# Patient Record
Sex: FEMALE | Race: BLACK OR AFRICAN AMERICAN | Employment: UNEMPLOYED | URBAN - METROPOLITAN AREA
[De-identification: names, ages, dates, MRNs, and addresses within clinical notes are randomized per-mention and may not be internally consistent; named-entity substitution may affect disease eponyms.]

---

## 2021-03-06 ENCOUNTER — APPOINTMENT (OUTPATIENT)
Dept: GENERAL RADIOLOGY | Age: 46
End: 2021-03-06
Attending: EMERGENCY MEDICINE
Payer: MEDICARE

## 2021-03-06 ENCOUNTER — HOSPITAL ENCOUNTER (EMERGENCY)
Age: 46
Discharge: HOME | End: 2021-03-06
Attending: EMERGENCY MEDICINE
Payer: MEDICARE

## 2021-03-06 VITALS
RESPIRATION RATE: 17 BRPM | HEART RATE: 98 BPM | SYSTOLIC BLOOD PRESSURE: 122 MMHG | DIASTOLIC BLOOD PRESSURE: 97 MMHG | HEIGHT: 59 IN | WEIGHT: 165 LBS | OXYGEN SATURATION: 99 % | TEMPERATURE: 99.4 F | BODY MASS INDEX: 33.26 KG/M2

## 2021-03-06 DIAGNOSIS — R07.89 ATYPICAL CHEST PAIN: Primary | ICD-10-CM

## 2021-03-06 DIAGNOSIS — C50.919 METASTATIC BREAST CANCER (HCC): ICD-10-CM

## 2021-03-06 DIAGNOSIS — Z51.5 HOSPICE CARE: ICD-10-CM

## 2021-03-06 LAB
ANION GAP SERPL CALC-SCNC: 8 MMOL/L (ref 5–15)
ATRIAL RATE: 112 BPM
BASOPHILS # BLD: 0 K/UL (ref 0–0.1)
BASOPHILS NFR BLD: 0 % (ref 0–1)
BUN SERPL-MCNC: 5 MG/DL (ref 6–20)
BUN/CREAT SERPL: 6 (ref 12–20)
CA-I BLD-MCNC: 9.3 MG/DL (ref 8.5–10.1)
CALCULATED P AXIS, ECG09: 63 DEGREES
CALCULATED R AXIS, ECG10: 45 DEGREES
CALCULATED T AXIS, ECG11: 34 DEGREES
CHLORIDE SERPL-SCNC: 103 MMOL/L (ref 97–108)
CO2 SERPL-SCNC: 29 MMOL/L (ref 21–32)
CREAT SERPL-MCNC: 0.79 MG/DL (ref 0.55–1.02)
DIAGNOSIS, 93000: NORMAL
DIFFERENTIAL METHOD BLD: NORMAL
EOSINOPHIL # BLD: 0.1 K/UL (ref 0–0.4)
EOSINOPHIL NFR BLD: 1 % (ref 0–7)
ERYTHROCYTE [DISTWIDTH] IN BLOOD BY AUTOMATED COUNT: 13.1 % (ref 11.5–14.5)
GLUCOSE SERPL-MCNC: 92 MG/DL (ref 65–100)
HCT VFR BLD AUTO: 40.3 % (ref 35–47)
HGB BLD-MCNC: 13 G/DL (ref 11.5–16)
IMM GRANULOCYTES # BLD AUTO: 0 K/UL (ref 0–0.04)
IMM GRANULOCYTES NFR BLD AUTO: 0 % (ref 0–0.5)
LYMPHOCYTES # BLD: 1.7 K/UL (ref 0.8–3.5)
LYMPHOCYTES NFR BLD: 19 % (ref 12–49)
MCH RBC QN AUTO: 28 PG (ref 26–34)
MCHC RBC AUTO-ENTMCNC: 32.3 G/DL (ref 30–36.5)
MCV RBC AUTO: 86.9 FL (ref 80–99)
MONOCYTES # BLD: 0.6 K/UL (ref 0–1)
MONOCYTES NFR BLD: 7 % (ref 5–13)
NEUTS SEG # BLD: 6.5 K/UL (ref 1.8–8)
NEUTS SEG NFR BLD: 73 % (ref 32–75)
P-R INTERVAL, ECG05: 134 MS
PLATELET # BLD AUTO: 399 K/UL (ref 150–400)
PMV BLD AUTO: 10.6 FL (ref 8.9–12.9)
POTASSIUM SERPL-SCNC: 3.6 MMOL/L (ref 3.5–5.1)
Q-T INTERVAL, ECG07: 330 MS
QRS DURATION, ECG06: 72 MS
QTC CALCULATION (BEZET), ECG08: 450 MS
RBC # BLD AUTO: 4.64 M/UL (ref 3.8–5.2)
SODIUM SERPL-SCNC: 140 MMOL/L (ref 136–145)
TROPONIN I SERPL-MCNC: <0.05 NG/ML
VENTRICULAR RATE, ECG03: 112 BPM
WBC # BLD AUTO: 8.9 K/UL (ref 3.6–11)

## 2021-03-06 PROCEDURE — 99285 EMERGENCY DEPT VISIT HI MDM: CPT

## 2021-03-06 PROCEDURE — 93005 ELECTROCARDIOGRAM TRACING: CPT

## 2021-03-06 PROCEDURE — 71045 X-RAY EXAM CHEST 1 VIEW: CPT

## 2021-03-06 PROCEDURE — 36415 COLL VENOUS BLD VENIPUNCTURE: CPT

## 2021-03-06 PROCEDURE — 85025 COMPLETE CBC W/AUTO DIFF WBC: CPT

## 2021-03-06 PROCEDURE — 74011250636 HC RX REV CODE- 250/636: Performed by: EMERGENCY MEDICINE

## 2021-03-06 PROCEDURE — 84484 ASSAY OF TROPONIN QUANT: CPT

## 2021-03-06 PROCEDURE — 80048 BASIC METABOLIC PNL TOTAL CA: CPT

## 2021-03-06 PROCEDURE — 96374 THER/PROPH/DIAG INJ IV PUSH: CPT

## 2021-03-06 RX ORDER — ASPIRIN 325 MG
325 TABLET ORAL
Status: DISCONTINUED | OUTPATIENT
Start: 2021-03-06 | End: 2021-03-06 | Stop reason: HOSPADM

## 2021-03-06 RX ORDER — MORPHINE SULFATE 2 MG/ML
2 INJECTION, SOLUTION INTRAMUSCULAR; INTRAVENOUS ONCE
Status: COMPLETED | OUTPATIENT
Start: 2021-03-06 | End: 2021-03-06

## 2021-03-06 RX ADMIN — MORPHINE SULFATE 2 MG: 2 INJECTION, SOLUTION INTRAMUSCULAR; INTRAVENOUS at 16:40

## 2021-03-06 NOTE — ED PROVIDER NOTES
EMERGENCY DEPARTMENT HISTORY AND PHYSICAL EXAM      Date: 3/6/2021  Patient Name: Sher Marmolejo    History of Presenting Illness     Chief Complaint   Patient presents with    Chest Pain       History Provided By: Patient    HPI: Sher Marmolejo, 48 y.o. female presents to the ED with cc of left-sided chest pain located mid sternal, moderate severity, no known exacerbating or relieving factors. Quality of pain is dull pressure with no radiation. Associated with shortness of breath, tachycardia, diaphoresis and palpitations. Pain resolved after taking nitro and morphine. Patient has a past medical history remarkable for coronary artery disease needing stent placement, right sided brain tumor and bilateral triple negative breast cancer. Pt also admits to being on hospice. Chief Complaint   Patient presents with    Chest Pain         There are no other complaints, changes, or physical findings at this time. PCP: None    No current facility-administered medications on file prior to encounter. No current outpatient medications on file prior to encounter. Past History     Past Medical History:  No past medical history on file. Past Surgical History:  No past surgical history on file. Family History:  No family history on file. Social History:  Social History     Tobacco Use    Smoking status: Not on file   Substance Use Topics    Alcohol use: Not on file    Drug use: Not on file       Allergies: Allergies   Allergen Reactions    Pcn [Penicillins] Swelling         Review of Systems   Review of Systems   Constitutional: Negative. HENT: Negative for congestion, nosebleeds, sinus pressure and sinus pain. Eyes: Negative. Negative for photophobia. Respiratory: Positive for chest tightness and shortness of breath. Negative for apnea, cough, choking, wheezing and stridor. Cardiovascular: Positive for chest pain and palpitations. Gastrointestinal: Negative.     Genitourinary: Negative. Musculoskeletal: Negative. Negative for back pain and gait problem. Skin: Negative. Allergic/Immunologic: Negative. Neurological: Negative. Negative for weakness, light-headedness and numbness. Hematological: Negative. Psychiatric/Behavioral: Negative. Physical Exam   Physical Exam  Vitals signs and nursing note reviewed. Constitutional:       Appearance: Normal appearance. HENT:      Head: Normocephalic and atraumatic. Mouth/Throat:      Pharynx: Oropharynx is clear. Eyes:      Extraocular Movements: Extraocular movements intact. Pupils: Pupils are equal, round, and reactive to light. Neck:      Musculoskeletal: Normal range of motion and neck supple. Cardiovascular:      Rate and Rhythm: Regular rhythm. Tachycardia present. Pulses: Normal pulses. Heart sounds: Normal heart sounds. Comments: Chest wall tenderness mild  Pulmonary:      Breath sounds: Normal breath sounds. Abdominal:      General: Abdomen is flat. Bowel sounds are normal.      Palpations: Abdomen is soft. Musculoskeletal: Normal range of motion. Skin:     General: Skin is warm and dry. Neurological:      General: No focal deficit present. Mental Status: She is alert and oriented to person, place, and time. Psychiatric:         Mood and Affect: Mood is anxious.          Behavior: Behavior normal.         Diagnostic Study Results     Labs -     Recent Results (from the past 12 hour(s))   EKG, 12 LEAD, INITIAL    Collection Time: 03/06/21  4:03 PM   Result Value Ref Range    Ventricular Rate 112 BPM    Atrial Rate 112 BPM    P-R Interval 134 ms    QRS Duration 72 ms    Q-T Interval 330 ms    QTC Calculation (Bezet) 450 ms    Calculated P Axis 63 degrees    Calculated R Axis 45 degrees    Calculated T Axis 34 degrees    Diagnosis       Sinus tachycardia  Possible Left atrial enlargement  Borderline ECG  No previous ECGs available  Confirmed by Peace LYN, [address] [place] [address] ([id number]) on 3/6/2021 4:49:95 PM     METABOLIC PANEL, BASIC    Collection Time: 03/06/21  4:30 PM   Result Value Ref Range    Sodium 140 136 - 145 mmol/L    Potassium 3.6 3.5 - 5.1 mmol/L    Chloride 103 97 - 108 mmol/L    CO2 29 21 - 32 mmol/L    Anion gap 8 5 - 15 mmol/L    Glucose 92 65 - 100 mg/dL    BUN 5 (L) 6 - 20 mg/dL    Creatinine 0.79 0.55 - 1.02 mg/dL    BUN/Creatinine ratio 6 (L) 12 - 20      GFR est AA >60 >60 ml/min/1.73m2    GFR est non-AA >60 >60 ml/min/1.73m2    Calcium 9.3 8.5 - 10.1 mg/dL   CBC WITH AUTOMATED DIFF    Collection Time: 03/06/21  4:30 PM   Result Value Ref Range    WBC 8.9 3.6 - 11.0 K/uL    RBC 4.64 3.80 - 5.20 M/uL    HGB 13.0 11.5 - 16.0 g/dL    HCT 40.3 35.0 - 47.0 %    MCV 86.9 80.0 - 99.0 FL    MCH 28.0 26.0 - 34.0 PG    MCHC 32.3 30.0 - 36.5 g/dL    RDW 13.1 11.5 - 14.5 %    PLATELET 951 636 - 344 K/uL    MPV 10.6 8.9 - 12.9 FL    NEUTROPHILS 73 32 - 75 %    LYMPHOCYTES 19 12 - 49 %    MONOCYTES 7 5 - 13 %    EOSINOPHILS 1 0 - 7 %    BASOPHILS 0 0 - 1 %    IMMATURE GRANULOCYTES 0 0.0 - 0.5 %    ABS. NEUTROPHILS 6.5 1.8 - 8.0 K/UL    ABS. LYMPHOCYTES 1.7 0.8 - 3.5 K/UL    ABS. MONOCYTES 0.6 0.0 - 1.0 K/UL    ABS. EOSINOPHILS 0.1 0.0 - 0.4 K/UL    ABS. BASOPHILS 0.0 0.0 - 0.1 K/UL    ABS. IMM. GRANS. 0.0 0.00 - 0.04 K/UL    DF AUTOMATED     TROPONIN I    Collection Time: 03/06/21  4:30 PM   Result Value Ref Range    Troponin-I, Qt. <0.05 <0.05 ng/mL       Labs reviewed by me    Radiologic Studies -   XR CHEST PORT   Final Result   The heart, mediastinum, and pulmonary vasculature appear within   normal limits. Right venous port catheter terminates over the SVC at the level   of the right hilum. The heart, mediastinum, and pulmonary vasculature appear   within normal limits. No evidence of pneumothorax. CT Results  (Last 48 hours)    None        CXR Results  (Last 48 hours)               03/06/21 1643  XR CHEST PORT Final result    Impression:   The heart, mediastinum, and pulmonary vasculature appear within   normal limits. Right venous port catheter terminates over the SVC at the level   of the right hilum. The heart, mediastinum, and pulmonary vasculature appear   within normal limits. No evidence of pneumothorax. Narrative:  Portable chest, 1648 hours. No comparisons. Medical Decision Making     I am the first provider for this patient. I reviewed the vital signs, available nursing notes, past medical history, past surgical history, family history and social history. RADIOLOGY report and LABS reviewed by me    Vital Signs-Reviewed the patient's vital signs. Patient Vitals for the past 12 hrs:   Temp Pulse Resp BP SpO2   03/06/21 1930  98 17 (!) 122/97 99 %   03/06/21 1830  91 16 131/82 99 %   03/06/21 1603 99.4 °F (37.4 °C) (!) 118 18 (!) 135/91 99 %       EKG interpretation: (Preliminary)      Records Reviewed: Nurse's note. Provider Notes (Medical Decision Making):    Patient presents with DIFF DX :         ED Course:   Initial assessment performed. The patients presenting problems have been discussed, and they are in agreement with the care plan formulated and outlined with them. I have encouraged them to ask questions as they arise throughout their visit. TREATMENT RESPONSE -Stable          Cyndi Kumar MD      Disposition:  Discharged   Diagnostic tests were reviewed and questions answered. Diagnosis, care plan and treatment options were discussed. The patient understand instructions and will follow up as directed. Condition stable    Admitting Provider:  No admitting provider for patient encounter. Consulting Provider:  No ref. provider found       DISCHARGE PLAN:  1. There are no discharge medications for this patient. 2.   Follow-up Information    None       3. Return to ED if worse     Diagnosis     Clinical Impression:     ICD-10-CM ICD-9-CM    1. Atypical chest pain  R07.89 786.59    2.  Metastatic breast cancer ([facility] [address] C50.919 174.9    3. Hospice care  Z51.5 V66.7         Attestations:    [facility] MD DONNELL    Please note that this dictation was completed with [email or web address], the computer voice recognition software. Quite often unanticipated grammatical, syntax, homophones, and other interpretive errors are inadvertently transcribed by the computer software. Please disregard these errors. Please excuse any errors that have escaped final proofreading. Thank you.

## 2021-03-06 NOTE — ED TRIAGE NOTES
Chest pain for a week left side, tight and sharp eases with nitro, has had two today and one approx an hour ago, EMS gave , hx of stg 4 breast CA, on hospice

## 2021-03-07 NOTE — DISCHARGE INSTRUCTIONS
Thank you! Thank you for allowing me to care for you in the emergency department. I sincerely hope that you are satisfied with your visit today. It is my goal to provide you with excellent care. Below you will find a list of your labs and imaging from your visit today. Should you have any questions regarding these results please do not hesitate to call the emergency department. Labs -     Recent Results (from the past 12 hour(s))   METABOLIC PANEL, BASIC    Collection Time: 03/06/21  4:30 PM   Result Value Ref Range    Sodium 140 136 - 145 mmol/L    Potassium 3.6 3.5 - 5.1 mmol/L    Chloride 103 97 - 108 mmol/L    CO2 29 21 - 32 mmol/L    Anion gap 8 5 - 15 mmol/L    Glucose 92 65 - 100 mg/dL    BUN 5 (L) 6 - 20 mg/dL    Creatinine 0.79 0.55 - 1.02 mg/dL    BUN/Creatinine ratio 6 (L) 12 - 20      GFR est AA >60 >60 ml/min/1.73m2    GFR est non-AA >60 >60 ml/min/1.73m2    Calcium 9.3 8.5 - 10.1 mg/dL   CBC WITH AUTOMATED DIFF    Collection Time: 03/06/21  4:30 PM   Result Value Ref Range    WBC 8.9 3.6 - 11.0 K/uL    RBC 4.64 3.80 - 5.20 M/uL    HGB 13.0 11.5 - 16.0 g/dL    HCT 40.3 35.0 - 47.0 %    MCV 86.9 80.0 - 99.0 FL    MCH 28.0 26.0 - 34.0 PG    MCHC 32.3 30.0 - 36.5 g/dL    RDW 13.1 11.5 - 14.5 %    PLATELET 907 555 - 453 K/uL    MPV 10.6 8.9 - 12.9 FL    NEUTROPHILS 73 32 - 75 %    LYMPHOCYTES 19 12 - 49 %    MONOCYTES 7 5 - 13 %    EOSINOPHILS 1 0 - 7 %    BASOPHILS 0 0 - 1 %    IMMATURE GRANULOCYTES 0 0.0 - 0.5 %    ABS. NEUTROPHILS 6.5 1.8 - 8.0 K/UL    ABS. LYMPHOCYTES 1.7 0.8 - 3.5 K/UL    ABS. MONOCYTES 0.6 0.0 - 1.0 K/UL    ABS. EOSINOPHILS 0.1 0.0 - 0.4 K/UL    ABS. BASOPHILS 0.0 0.0 - 0.1 K/UL    ABS. IMM.  GRANS. 0.0 0.00 - 0.04 K/UL    DF AUTOMATED     TROPONIN I    Collection Time: 03/06/21  4:30 PM   Result Value Ref Range    Troponin-I, Qt. <0.05 <0.05 ng/mL       Radiologic Studies -   XR CHEST PORT   Final Result   The heart, mediastinum, and pulmonary vasculature appear within normal limits. Right venous port catheter terminates over the SVC at the level   of the right hilum. The heart, mediastinum, and pulmonary vasculature appear   within normal limits. No evidence of pneumothorax. CT Results  (Last 48 hours)      None          CXR Results  (Last 48 hours)                 03/06/21 1643  XR CHEST PORT Final result    Impression: The heart, mediastinum, and pulmonary vasculature appear within   normal limits. Right venous port catheter terminates over the SVC at the level   of the right hilum. The heart, mediastinum, and pulmonary vasculature appear   within normal limits. No evidence of pneumothorax. Narrative:  Portable chest, 1648 hours. No comparisons. If you feel that you have not received excellent quality care or timely care, please ask to speak to the nurse manager. Please choose us in the future for your continued health care needs. ------------------------------------------------------------------------------------------------------------  The exam and treatment you received in the Emergency Department were for an urgent problem and are not intended as complete care. It is important that you follow-up with a doctor, nurse practitioner, or physician assistant to:  (1) confirm your diagnosis,  (2) re-evaluation of changes in your illness and treatment, and  (3) for ongoing care. If your symptoms become worse or you do not improve as expected and you are unable to reach your usual health care provider, you should return to the Emergency Department. We are available 24 hours a day. Please take your discharge instructions with you when you go to your follow-up appointment. If you have any problem arranging a follow-up appointment, contact the Emergency Department immediately. If a prescription has been provided, please have it filled as soon as possible to prevent a delay in treatment.  Read the entire medication instruction sheet provided to you by the pharmacy. If you have any questions or reservations about taking the medication due to side effects or interactions with other medications, please call your primary care physician or contact the ER to speak with the charge nurse. Make an appointment with your family doctor or the physician you were referred to for follow-up of this visit as instructed on your discharge paperwork, as this is a mandatory follow-up. Return to the ER if you are unable to be seen or if you are unable to be seen in a timely manner. If you have any problem arranging the follow-up visit, contact the Emergency Department immediately.

## 2021-12-06 ENCOUNTER — HOSPICE ADMISSION (OUTPATIENT)
Dept: HOSPICE | Facility: HOSPICE | Age: 46
End: 2021-12-06
Payer: COMMERCIAL

## 2021-12-06 ENCOUNTER — HOSPITAL ENCOUNTER (INPATIENT)
Age: 46
LOS: 3 days | Discharge: HOSPICE | End: 2021-12-09
Attending: FAMILY MEDICINE | Admitting: FAMILY MEDICINE

## 2021-12-06 DIAGNOSIS — G89.3 CANCER ASSOCIATED PAIN: ICD-10-CM

## 2021-12-06 DIAGNOSIS — Z51.5 HOSPICE CARE: ICD-10-CM

## 2021-12-06 DIAGNOSIS — C50.919 METASTATIC BREAST CANCER (HCC): ICD-10-CM

## 2021-12-06 PROCEDURE — 0656 HSPC GENERAL INPATIENT

## 2021-12-06 PROCEDURE — G0299 HHS/HOSPICE OF RN EA 15 MIN: HCPCS

## 2021-12-06 PROCEDURE — 74011250637 HC RX REV CODE- 250/637: Performed by: FAMILY MEDICINE

## 2021-12-06 PROCEDURE — 74011250636 HC RX REV CODE- 250/636: Performed by: FAMILY MEDICINE

## 2021-12-06 PROCEDURE — 3336500001 HSPC ELECTION

## 2021-12-06 PROCEDURE — 74011250637 HC RX REV CODE- 250/637: Performed by: INTERNAL MEDICINE

## 2021-12-06 RX ORDER — GABAPENTIN 300 MG/1
300 CAPSULE ORAL 3 TIMES DAILY
Status: DISCONTINUED | OUTPATIENT
Start: 2021-12-06 | End: 2021-12-07

## 2021-12-06 RX ORDER — QUETIAPINE FUMARATE 100 MG/1
100 TABLET, FILM COATED ORAL DAILY
Status: DISCONTINUED | OUTPATIENT
Start: 2021-12-06 | End: 2021-12-06

## 2021-12-06 RX ORDER — QUETIAPINE FUMARATE 100 MG/1
300 TABLET, FILM COATED ORAL
Status: DISCONTINUED | OUTPATIENT
Start: 2021-12-07 | End: 2021-12-09 | Stop reason: HOSPADM

## 2021-12-06 RX ORDER — HYOSCYAMINE SULFATE 0.12 MG/1
0.12 TABLET SUBLINGUAL
Status: DISCONTINUED | OUTPATIENT
Start: 2021-12-06 | End: 2021-12-09 | Stop reason: HOSPADM

## 2021-12-06 RX ORDER — MORPHINE SULFATE 20 MG/ML
5 SOLUTION ORAL
Status: DISCONTINUED | OUTPATIENT
Start: 2021-12-06 | End: 2021-12-06

## 2021-12-06 RX ORDER — ALBUTEROL SULFATE 90 UG/1
2 AEROSOL, METERED RESPIRATORY (INHALATION)
Status: DISCONTINUED | OUTPATIENT
Start: 2021-12-06 | End: 2021-12-09 | Stop reason: HOSPADM

## 2021-12-06 RX ORDER — DEXAMETHASONE 4 MG/1
4 TABLET ORAL 2 TIMES DAILY
Status: DISCONTINUED | OUTPATIENT
Start: 2021-12-06 | End: 2021-12-09 | Stop reason: HOSPADM

## 2021-12-06 RX ORDER — LEVETIRACETAM 500 MG/1
500 TABLET ORAL 3 TIMES DAILY
Status: DISCONTINUED | OUTPATIENT
Start: 2021-12-06 | End: 2021-12-09 | Stop reason: HOSPADM

## 2021-12-06 RX ORDER — LORAZEPAM 2 MG/ML
2 CONCENTRATE ORAL
Status: DISCONTINUED | OUTPATIENT
Start: 2021-12-06 | End: 2021-12-09 | Stop reason: HOSPADM

## 2021-12-06 RX ORDER — LORAZEPAM 2 MG/ML
1 CONCENTRATE ORAL
Status: DISCONTINUED | OUTPATIENT
Start: 2021-12-06 | End: 2021-12-06

## 2021-12-06 RX ORDER — METOPROLOL SUCCINATE 50 MG/1
50 TABLET, EXTENDED RELEASE ORAL DAILY
Status: DISCONTINUED | OUTPATIENT
Start: 2021-12-06 | End: 2021-12-09 | Stop reason: HOSPADM

## 2021-12-06 RX ORDER — LORAZEPAM 2 MG/ML
1 CONCENTRATE ORAL
Status: DISCONTINUED | OUTPATIENT
Start: 2021-12-06 | End: 2021-12-09 | Stop reason: HOSPADM

## 2021-12-06 RX ORDER — TRAZODONE HYDROCHLORIDE 50 MG/1
50 TABLET ORAL
Status: DISCONTINUED | OUTPATIENT
Start: 2021-12-06 | End: 2021-12-09 | Stop reason: HOSPADM

## 2021-12-06 RX ORDER — LORAZEPAM 1 MG/1
1 TABLET ORAL
Status: DISCONTINUED | OUTPATIENT
Start: 2021-12-06 | End: 2021-12-09 | Stop reason: HOSPADM

## 2021-12-06 RX ORDER — SPIRONOLACTONE 25 MG/1
25 TABLET ORAL DAILY
Status: DISCONTINUED | OUTPATIENT
Start: 2021-12-06 | End: 2021-12-09 | Stop reason: HOSPADM

## 2021-12-06 RX ORDER — SENNOSIDES 8.6 MG/1
1 TABLET ORAL DAILY PRN
Status: DISCONTINUED | OUTPATIENT
Start: 2021-12-06 | End: 2021-12-09 | Stop reason: HOSPADM

## 2021-12-06 RX ORDER — PROMETHAZINE HYDROCHLORIDE 25 MG/1
25 TABLET ORAL
Status: DISCONTINUED | OUTPATIENT
Start: 2021-12-06 | End: 2021-12-09 | Stop reason: HOSPADM

## 2021-12-06 RX ORDER — HYDROMORPHONE HYDROCHLORIDE 2 MG/ML
2 INJECTION, SOLUTION INTRAMUSCULAR; INTRAVENOUS; SUBCUTANEOUS
Status: DISCONTINUED | OUTPATIENT
Start: 2021-12-06 | End: 2021-12-07

## 2021-12-06 RX ORDER — METHADONE HYDROCHLORIDE 10 MG/1
40 TABLET ORAL 3 TIMES DAILY
Status: DISCONTINUED | OUTPATIENT
Start: 2021-12-06 | End: 2021-12-06

## 2021-12-06 RX ORDER — SULFAMETHOXAZOLE AND TRIMETHOPRIM 800; 160 MG/1; MG/1
2 TABLET ORAL EVERY 12 HOURS
Status: DISCONTINUED | OUTPATIENT
Start: 2021-12-06 | End: 2021-12-06

## 2021-12-06 RX ORDER — FACIAL-BODY WIPES
10 EACH TOPICAL DAILY PRN
Status: DISCONTINUED | OUTPATIENT
Start: 2021-12-06 | End: 2021-12-09 | Stop reason: HOSPADM

## 2021-12-06 RX ORDER — DICYCLOMINE HYDROCHLORIDE 10 MG/1
20 CAPSULE ORAL 2 TIMES DAILY
Status: DISCONTINUED | OUTPATIENT
Start: 2021-12-06 | End: 2021-12-09 | Stop reason: HOSPADM

## 2021-12-06 RX ORDER — ACETAMINOPHEN 650 MG/1
650 SUPPOSITORY RECTAL
Status: DISCONTINUED | OUTPATIENT
Start: 2021-12-06 | End: 2021-12-09 | Stop reason: HOSPADM

## 2021-12-06 RX ORDER — MORPHINE SULFATE 30 MG/1
60 TABLET, FILM COATED, EXTENDED RELEASE ORAL EVERY 12 HOURS
Status: DISCONTINUED | OUTPATIENT
Start: 2021-12-07 | End: 2021-12-07

## 2021-12-06 RX ORDER — CARISOPRODOL 350 MG/1
350 TABLET ORAL 3 TIMES DAILY
Status: DISCONTINUED | OUTPATIENT
Start: 2021-12-06 | End: 2021-12-09 | Stop reason: HOSPADM

## 2021-12-06 RX ORDER — LORAZEPAM 2 MG/ML
0.5 CONCENTRATE ORAL
Status: DISCONTINUED | OUTPATIENT
Start: 2021-12-06 | End: 2021-12-06

## 2021-12-06 RX ORDER — PHENYTOIN SODIUM 100 MG/1
100 CAPSULE, EXTENDED RELEASE ORAL 3 TIMES DAILY
Status: DISCONTINUED | OUTPATIENT
Start: 2021-12-06 | End: 2021-12-09 | Stop reason: HOSPADM

## 2021-12-06 RX ORDER — HYDROMORPHONE HYDROCHLORIDE 2 MG/1
8 TABLET ORAL
Status: DISCONTINUED | OUTPATIENT
Start: 2021-12-06 | End: 2021-12-08

## 2021-12-06 RX ORDER — METRONIDAZOLE 250 MG/1
500 TABLET ORAL DAILY
Status: DISCONTINUED | OUTPATIENT
Start: 2021-12-07 | End: 2021-12-09 | Stop reason: HOSPADM

## 2021-12-06 RX ORDER — DOCUSATE SODIUM 100 MG/1
100 CAPSULE, LIQUID FILLED ORAL
Status: DISCONTINUED | OUTPATIENT
Start: 2021-12-06 | End: 2021-12-09 | Stop reason: HOSPADM

## 2021-12-06 RX ORDER — HALOPERIDOL 2 MG/ML
2 SOLUTION ORAL
Status: DISCONTINUED | OUTPATIENT
Start: 2021-12-06 | End: 2021-12-09 | Stop reason: HOSPADM

## 2021-12-06 RX ADMIN — SPIRONOLACTONE 25 MG: 25 TABLET ORAL at 14:27

## 2021-12-06 RX ADMIN — HYDROMORPHONE HYDROCHLORIDE 8 MG: 2 TABLET ORAL at 11:07

## 2021-12-06 RX ADMIN — PHENYTOIN SODIUM 100 MG: 100 CAPSULE ORAL at 14:26

## 2021-12-06 RX ADMIN — LORAZEPAM 1 MG: 2 SOLUTION, CONCENTRATE ORAL at 22:19

## 2021-12-06 RX ADMIN — DEXAMETHASONE 4 MG: 4 TABLET ORAL at 14:26

## 2021-12-06 RX ADMIN — HYDROMORPHONE HYDROCHLORIDE 2 MG: 2 INJECTION INTRAMUSCULAR; INTRAVENOUS; SUBCUTANEOUS at 18:14

## 2021-12-06 RX ADMIN — LEVETIRACETAM 500 MG: 500 TABLET, FILM COATED ORAL at 22:00

## 2021-12-06 RX ADMIN — MORPHINE SULFATE 5 MG: 10 SOLUTION ORAL at 11:08

## 2021-12-06 RX ADMIN — HYDROMORPHONE HYDROCHLORIDE 2 MG: 2 INJECTION INTRAMUSCULAR; INTRAVENOUS; SUBCUTANEOUS at 16:14

## 2021-12-06 RX ADMIN — LORAZEPAM 0.5 MG: 2 SOLUTION, CONCENTRATE ORAL at 19:59

## 2021-12-06 RX ADMIN — GABAPENTIN 300 MG: 300 CAPSULE ORAL at 11:07

## 2021-12-06 RX ADMIN — HYDROMORPHONE HYDROCHLORIDE 2 MG: 2 INJECTION INTRAMUSCULAR; INTRAVENOUS; SUBCUTANEOUS at 20:45

## 2021-12-06 RX ADMIN — METOPROLOL SUCCINATE 50 MG: 50 TABLET, EXTENDED RELEASE ORAL at 14:27

## 2021-12-06 RX ADMIN — HYDROMORPHONE HYDROCHLORIDE 2 MG: 2 INJECTION INTRAMUSCULAR; INTRAVENOUS; SUBCUTANEOUS at 14:20

## 2021-12-06 RX ADMIN — HYDROMORPHONE HYDROCHLORIDE 2 MG: 2 INJECTION INTRAMUSCULAR; INTRAVENOUS; SUBCUTANEOUS at 19:58

## 2021-12-06 RX ADMIN — LORAZEPAM 0.5 MG: 2 SOLUTION, CONCENTRATE ORAL at 18:13

## 2021-12-06 RX ADMIN — HYDROMORPHONE HYDROCHLORIDE 2 MG: 2 INJECTION INTRAMUSCULAR; INTRAVENOUS; SUBCUTANEOUS at 13:27

## 2021-12-06 RX ADMIN — HYDROMORPHONE HYDROCHLORIDE 2 MG: 2 INJECTION INTRAMUSCULAR; INTRAVENOUS; SUBCUTANEOUS at 21:29

## 2021-12-06 RX ADMIN — HYDROMORPHONE HYDROCHLORIDE 2 MG: 2 INJECTION INTRAMUSCULAR; INTRAVENOUS; SUBCUTANEOUS at 22:17

## 2021-12-06 RX ADMIN — CARISOPRODOL 350 MG: 350 TABLET ORAL at 21:31

## 2021-12-06 RX ADMIN — PHENYTOIN SODIUM 100 MG: 100 CAPSULE ORAL at 21:32

## 2021-12-06 RX ADMIN — LEVETIRACETAM 500 MG: 500 TABLET, FILM COATED ORAL at 14:27

## 2021-12-06 RX ADMIN — GABAPENTIN 300 MG: 300 CAPSULE ORAL at 21:32

## 2021-12-06 RX ADMIN — LORAZEPAM 0.5 MG: 2 SOLUTION, CONCENTRATE ORAL at 14:20

## 2021-12-06 RX ADMIN — HYDROMORPHONE HYDROCHLORIDE 2 MG: 2 INJECTION INTRAMUSCULAR; INTRAVENOUS; SUBCUTANEOUS at 22:33

## 2021-12-06 RX ADMIN — CARISOPRODOL 350 MG: 350 TABLET ORAL at 14:26

## 2021-12-06 RX ADMIN — HYDROMORPHONE HYDROCHLORIDE 2 MG: 2 INJECTION INTRAMUSCULAR; INTRAVENOUS; SUBCUTANEOUS at 18:55

## 2021-12-06 RX ADMIN — DICYCLOMINE HYDROCHLORIDE 20 MG: 10 CAPSULE ORAL at 14:27

## 2021-12-06 RX ADMIN — HYDROMORPHONE HYDROCHLORIDE 2 MG: 2 INJECTION INTRAMUSCULAR; INTRAVENOUS; SUBCUTANEOUS at 12:26

## 2021-12-06 RX ADMIN — LORAZEPAM 0.5 MG: 2 SOLUTION, CONCENTRATE ORAL at 11:08

## 2021-12-06 NOTE — H&P
[facility]  [phone]    Patient Name: Asim Kirby  YOB: 1975    Date of Provider Hospice Visit: 12/06/21    Level of Care:   [x] General Inpatient (GIP)    [] Routine   [] Respite    Current Location of Care:  [] [facility] [] [facility] [] [address] [] [facility] [x] Hospice House [facility], patient referred from:  [] [facility] [] [facility] [] [address] [] [facility] [x] Home [] Other:     [facility] patient    Principle Hospice Diagnosis: Metastatic breast cancer  Diagnoses RELATED to the terminal prognosis: Cancer associated pain, history of stroke, history of schizophrenia, history of seizures  Other Diagnoses:      Irvin Hook is a 48y.o. year old who was admitted to [facility]. 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 Patient's pain is mostly located in the left arm/scapula/wound in the left breast.  Patient with triple negative breast cancer and currently under the care of [facility]. In discussing with patient, they have been escalating doses of methadone and currently on 40 mg 3 times daily but she does not feel like this is providing any benefit. She also takes Dilaudid 8 mg every 4 hours, gabapentin 300 mg 3 times daily. She has used as needed morphine which actually seem to help as well. Additional adjunct of medication include Decadron 4 mg twice a day and Soma care 50 mg 3 times a day. She is on seizure medication to include Dilantin and Keppra. Patient recovering from herpes zoster/shingles to the left scapular area. In addition, patient has a draining wound from the central aspect of the left breast.  Has significant lymphedema in the left which is where she originally had surgery with lymph node dissection.     The patient's principle diagnosis has resulted in uncontrolled pain  Refer to LCD     Functionally, the patient's Karnofsky and/or Palliative Performance Scale has declined over a period of weeks to months and is estimated at 40 the patient is dependent on the following ADLs: Able to feed herself    Objective information that support this patients limited prognosis includes: Metastatic breast cancer    The patient/family chose comfort measures with the support of Hospice. HOSPICE DIAGNOSES   Active Symptoms:  1. Cancer associated pain  2. History of seizures  3. Wound to the central aspect of the left breast  4. Hospice care     PLAN   1. Patient admitted GIP level care she needs frequent nursing assessment, IV pain management to start, not safe to transition home, having escalating doses of opioids in the home setting without successful management of pain. Patient's pain regiment fairly complicated we will make further adjustments. 2. Pain management-we will stop her methadone since this did not appear to be effective and start long-acting MS Contin 60 mg every 12. Patient felt like she had some benefit from morphine. This is significantly underdosed based on the amount of methadone she was using but I would like some time for the methadone to dissipate and we will adjust the MS Contin accordingly. In addition, we will access her port and start Dilaudid 2 mg IV every 15 minutes as needed. I would like to see what her needs are over the next 24 hours and then make further decisions about pain management. Patient may need a PCA given the amount of opioids she is requiring. Continue adjunct of therapy with Soma, Decadron, gabapentin. 3. Seizure disorder-continue home medication  4. Wound care-patient with slightly draining wound-we will use alginate or other type dressing secondary to the draining wound. Continue foam dressing  5. Comfort meds for all other symptoms  6. Plan with bedside nursing team, patient, son    7.  and SW to support family needs  8. Disposition: To be determined  9.  Hospice Plan of care was reviewed in detail and agree with current plan of care    Prognosis estimated based on 12/06/21 clinical assessment is:   [] Hours to Days    [x] Days to Weeks    [] Other:    Communicated plan of care with: Hospice Case Manager; Hospice IDT; Care Team     GOALS OF CARE     Patient/Medical POA stated Goal of Care: Hospice care    [] I have reviewed and/or updated ACP information in the Advance Care Planning Navigator. This information is available in the [address] link in the patient's chart header. Primary Decision Maker ([address]):     Resuscitation Status: DNR  If DNR is there a Durable DNR on file? : [x] Yes [] No (If no, complete Durable DNR)    HISTORY     History obtained from: Chart, patient    CHIEF COMPLAINT: Left chest/scapular pain  The patient is:   [x] Verbal  [] Nonverbal  [] Unresponsive    HPI/SUBJECTIVE: Patient is awake but uncomfortable.   Describes pain in her left arm, left scapula, left breast area       REVIEW OF SYSTEMS     The following systems were: [x] reviewed  [] unable to be reviewed    Positive ROS include:  Constitutional: fatigue, weakness, in pain, short of breath  Ears/nose/mouth/throat: increased airway secretions  Respiratory:shortness of breath, wheezing  Gastrointestinal:poor appetite, nausea, vomiting, abdominal pain, constipation, diarrhea  Musculoskeletal:pain, deformities, swelling legs  Neurologic:confusion, hallucinations, weakness  Psychiatric:anxiety, feeling depressed, poor sleep  Endocrine:     Adult Non-Verbal Pain Assessment Score: Pain can be as high as 8-9 out of 10    Face  [] 0   No particular expression or smile  [] 1   Occasional grimace, tearing, frowning, wrinkled forehead  [] 2   Frequent grimace, tearing, frowning, wrinkled forehead    Activity (movement)  [] 0   Lying quietly, normal position  [] 1   Seeking attention through movement or slow, cautious movement  [] 2   Restless, excessive activity and/or withdrawal reflexes    Guarding  [] 0   Lying quietly, no positioning of hands over areas of body  [] 1   Splinting areas of the body, tense  [] 2   Rigid, stiff    Physiology (vital signs)  [] 0   Stable vital signs  [] 1   Change in any of the following: SBP > 20mm Hg; HR > 20/minute  [] 2   Change in any of the following: SBP > 30mm Hg; HR > 25/minute    Respiratory  [] 0   Baseline RR/SpO2, compliant with ventilator  [] 1   RR > 10 above baseline, or 5% drop SpO2, mild asynchrony with ventilator  [] 2   RR > 20 above baseline, or 10% drop SpO2, asynchrony with ventilator     FUNCTIONAL ASSESSMENT     Palliative Performance Scale (PPS):30-40       PSYCHOSOCIAL/SPIRITUAL ASSESSMENT     Active Problems:    * No active hospital problems. *    No past medical history on file. No past surgical history on file. Social History     Tobacco Use    Smoking status: Not on file    Smokeless tobacco: Not on file   Substance Use Topics    Alcohol use: Not on file     No family history on file.    Allergies   Allergen Reactions    Pcn [Penicillins] Swelling      Current Facility-Administered Medications   Medication Dose Route Frequency    bisacodyL (DULCOLAX) suppository 10 mg  10 mg Rectal DAILY PRN    acetaminophen (TYLENOL) suppository 650 mg  650 mg Rectal Q6H PRN    albuterol (PROVENTIL HFA, VENTOLIN HFA, PROAIR HFA) inhaler 2 Puff (Patient Supplied)  2 Puff Inhalation Q6H PRN    docusate sodium (COLACE) capsule 100 mg  100 mg Oral DAILY PRN    dexAMETHasone (DECADRON) tablet 4 mg  4 mg Oral BID    dicyclomine (BENTYL) capsule 20 mg (Patient Supplied)  20 mg Oral BID    phenytoin ER (DILANTIN ER) ER capsule 100 mg (Patient Supplied)  100 mg Oral TID    gabapentin (NEURONTIN) capsule 300 mg  300 mg Oral TID    haloperidol (HALDOL) 2 mg/mL oral solution 2 mg  2 mg Oral Q4H PRN    HYDROmorphone (DILAUDID) tablet 8 mg  8 mg Oral Q4H PRN    hyoscyamine SL (LEVSIN/SL) tablet 0.125 mg  0.125 mg SubLINGual Q4H PRN    levETIRAcetam (KEPPRA) tablet 500 mg (Patient Supplied)  500 mg Oral TID    LORazepam (ATIVAN) tablet 1 mg  1 mg Oral Q4H PRN    LORazepam (INTENSOL) 2 mg/mL oral concentrate 0.5 mg  0.5 mg Oral Q2H    LORazepam (INTENSOL) 2 mg/mL oral concentrate 2 mg  2 mg Oral Q5MIN PRN    promethazine (PHENERGAN) tablet 25 mg  25 mg Oral TID PRN    senna (SENOKOT) tablet 8.6 mg (Patient Supplied)  1 Tablet Oral DAILY PRN    QUEtiapine (SEROquel) tablet 100 mg (Patient Supplied)  100 mg Oral DAILY    carisoprodoL (SOMA) tablet 350 mg (Patient Supplied)  350 mg Oral TID    spironolactone (ALDACTONE) tablet 25 mg (Patient Supplied)  25 mg Oral DAILY    metoprolol succinate (TOPROL-XL) XL tablet 50 mg (Patient Supplied)  50 mg Oral DAILY    traZODone (DESYREL) tablet 50 mg (Patient Supplied)  50 mg Oral QHS PRN    HYDROmorphone (DILAUDID) injection 2 mg  2 mg IntraVENous Q15MIN PRN    [START ON 12/7/2021] morphine CR (MS CONTIN) tablet 60 mg  60 mg Oral Q12H    [START ON 12/7/2021] metroNIDAZOLE (FLAGYL) tablet 500 mg  500 mg Topical DAILY        PHYSICAL EXAM     Wt Readings from Last 3 Encounters:   03/06/21 74.8 kg (165 lb)       Visit Vitals  /67   Pulse (!) 128   Temp 97.3 °F (36.3 °C)   Resp 20   SpO2 98%       Supplemental O2  [] Yes  [x] NO  Last bowel movement:     Currently this patient has:  [] Peripheral IV [] PICC  [] PORT [] ICD    [] Lester Catheter [] NG Tube   [] PEG Tube    [] Rectal Tube [] Drain  [x] Other: Port    Constitutional: alert and oriented, appears uncomfortable  Eyes: reactive  ENMT: moist  Cardio: RRR  Respiratory: CTA  Gastrointestinal: soft/NT  Musculoskeletal:muscle wasting, lymphedema on the left arm  Skin:draining lesion in medical aspect of the left breast-approximately 6-8 cm in diameter  Neurologic:nonfocal  Psychiatric: slightly anxious  Other:       Pertinent Lab and or Imaging Tests:  Lab Results   Component Value Date/Time    Sodium 140 03/06/2021 04:30 PM    Potassium 3.6 03/06/2021 04:30 PM Chloride 103 03/06/2021 04:30 PM    CO2 29 03/06/2021 04:30 PM    Anion gap 8 03/06/2021 04:30 PM    Glucose 92 03/06/2021 04:30 PM    BUN 5 (L) 03/06/2021 04:30 PM    Creatinine 0.79 03/06/2021 04:30 PM    BUN/Creatinine ratio 6 (L) 03/06/2021 04:30 PM    GFR est AA >60 03/06/2021 04:30 PM    GFR est non-AA >60 03/06/2021 04:30 PM    Calcium 9.3 03/06/2021 04:30 PM     No results found for: TP, ALBR, TALB, ALB        Total time:   Counseling / coordination time:   > 50% counseling / coordination?:

## 2021-12-06 NOTE — HOSPICE
1010-Pt arrived to [facility] by private transport, pt son. Pt awake and alert x4, able to make needs known. Pt assisted to bed and positioned for comfort. Pt with continous frowning, facial grimacing, and complaints of pain with and without touch. Pt given therapeutic communication and reassured while pharmacy working on medications. 1100-RN assessment completed. Pt rated her pain a 12 on a scale of 1-10. Oxygen placed on 3L NC as pt is SOB at rest.  Pt has 3+ edema in left arm and hand. Arm raised on pillows. Pt has son and family friend at bedside. Dilaudid, lorazepam, neurontin given PO. Will continue to monitor and assess. 1200-Pt continues to complain of pain. Right chest port accessed by [facility]. Pain medication changed to IV as PO medications have not been effective at home and pt is in a pain crisis. IV Dilaudid given. 1245-Pt RAY Voss at bedside giving pt comfort. 1315-Pt given lunch tray. Pt continues to be in a lot of pain, facial grimacing, rocking in bed, moaning, tearful. Pt given 2nd PRN of dilaudid via port access. Will continue to  Monitor and assess. Pt assisted to bathroom, able to ambulate at this with standby assistance. Pt son and friend at bedside. 1400-Pt continuing to complain of pain, tearful, and body very tense, grimacing. Pt stated she was still in a lot of pain. 1415-Pt given PRN Dilaudid via port access. Pt stated she was not hungry, maybe took a few bites of lunch. Pt encouraged to eat when able and wanting food. Son at bedside, stated he had not given his mother her routine home meds this morning. Home medications given as ordered. Pt took meds PO without difficulty. 1445-Pt signed paperwork with family and notary. 1515-Pt resting with eyes closed, will awaken when stimulated. 1630-Pt asleep, attempted to awaken pt but pt grimaced and would not open eyes. Pt relaxed when not stimulated.   PO medications held at this time as pt will not awaken to swallow safely. 1730-Pt sleeping soundly, neutral expression at this time. 1800-Pt walked to sofa, sitting up. Pt complaining of pain and asking for pain medication. 1815-Pt given IV dilaudid, pain rated 10 on a scale of 1-10. Pt took PO lorazepam as ordered. Pt dinner tray set up for pt. Will continue to monitor and assess. 1845-Pt stated pain medication did \"nothing for me. \"   Pt given another PRN Dilaudid. Will continue to monitor and assess. 1900-report given to oncoming RN.

## 2021-12-06 NOTE — PROGRESS NOTES
Problem: Falls - Risk of  Goal: *Absence of Falls  Description: Document Jonathan Meek Fall Risk and appropriate interventions in the flowsheet. Outcome: Progressing Towards Goal  Note: Fall Risk Interventions:            Medication Interventions: Bed/chair exit alarm, Patient to call before getting OOB, Teach patient to arise slowly    Elimination Interventions: Bed/chair exit alarm, Call light in reach              Problem: Patient Education: Go to Patient Education Activity  Goal: Patient/Family Education  Outcome: Progressing Towards Goal     Problem: Pressure Injury - Risk of  Goal: *Prevention of pressure injury  Description: Document Noah Scale and appropriate interventions in the flowsheet.   Outcome: Progressing Towards Goal  Note: Pressure Injury Interventions:  Sensory Interventions: Assess changes in LOC, Float heels, Check visual cues for pain, Maintain/enhance activity level, Pressure redistribution bed/mattress (bed type)    Moisture Interventions: Contain wound drainage, Maintain skin hydration (lotion/cream), Assess need for specialty bed    Activity Interventions: Assess need for specialty bed, Pressure redistribution bed/mattress(bed type)    Mobility Interventions: HOB 30 degrees or less, Pressure redistribution bed/mattress (bed type)    Nutrition Interventions: Document food/fluid/supplement intake, Offer support with meals,snacks and hydration    Friction and Shear Interventions: HOB 30 degrees or less, Lift sheet, Minimize layers                Problem: Patient Education: Go to Patient Education Activity  Goal: Patient/Family Education  Outcome: Progressing Towards Goal     Problem: Pain  Goal: *Control of Pain  Outcome: Progressing Towards Goal

## 2021-12-07 PROCEDURE — 74011250636 HC RX REV CODE- 250/636: Performed by: INTERNAL MEDICINE

## 2021-12-07 PROCEDURE — 74011250637 HC RX REV CODE- 250/637: Performed by: INTERNAL MEDICINE

## 2021-12-07 PROCEDURE — 74011250636 HC RX REV CODE- 250/636: Performed by: FAMILY MEDICINE

## 2021-12-07 PROCEDURE — 74011250637 HC RX REV CODE- 250/637: Performed by: FAMILY MEDICINE

## 2021-12-07 PROCEDURE — 0656 HSPC GENERAL INPATIENT

## 2021-12-07 PROCEDURE — G0299 HHS/HOSPICE OF RN EA 15 MIN: HCPCS

## 2021-12-07 RX ORDER — HYDROMORPHONE HYDROCHLORIDE 2 MG/ML
4 INJECTION, SOLUTION INTRAMUSCULAR; INTRAVENOUS; SUBCUTANEOUS
Status: DISCONTINUED | OUTPATIENT
Start: 2021-12-07 | End: 2021-12-09 | Stop reason: HOSPADM

## 2021-12-07 RX ORDER — GABAPENTIN 300 MG/1
600 CAPSULE ORAL 3 TIMES DAILY
Status: DISCONTINUED | OUTPATIENT
Start: 2021-12-07 | End: 2021-12-09 | Stop reason: HOSPADM

## 2021-12-07 RX ORDER — HYDROMORPHONE HYDROCHLORIDE 2 MG/ML
3 INJECTION, SOLUTION INTRAMUSCULAR; INTRAVENOUS; SUBCUTANEOUS
Status: DISCONTINUED | OUTPATIENT
Start: 2021-12-07 | End: 2021-12-07

## 2021-12-07 RX ORDER — MORPHINE SULFATE 30 MG/1
60 TABLET, FILM COATED, EXTENDED RELEASE ORAL 3 TIMES DAILY
Status: DISCONTINUED | OUTPATIENT
Start: 2021-12-07 | End: 2021-12-09 | Stop reason: HOSPADM

## 2021-12-07 RX ADMIN — CARISOPRODOL 350 MG: 350 TABLET ORAL at 21:41

## 2021-12-07 RX ADMIN — HYDROMORPHONE HYDROCHLORIDE 4 MG: 2 INJECTION, SOLUTION INTRAMUSCULAR; INTRAVENOUS; SUBCUTANEOUS at 21:56

## 2021-12-07 RX ADMIN — HYDROMORPHONE HYDROCHLORIDE 4 MG: 2 INJECTION, SOLUTION INTRAMUSCULAR; INTRAVENOUS; SUBCUTANEOUS at 17:41

## 2021-12-07 RX ADMIN — HYDROMORPHONE HYDROCHLORIDE 3 MG: 2 INJECTION, SOLUTION INTRAMUSCULAR; INTRAVENOUS; SUBCUTANEOUS at 06:09

## 2021-12-07 RX ADMIN — DICYCLOMINE HYDROCHLORIDE 20 MG: 10 CAPSULE ORAL at 18:10

## 2021-12-07 RX ADMIN — SPIRONOLACTONE 25 MG: 25 TABLET ORAL at 09:51

## 2021-12-07 RX ADMIN — LEVETIRACETAM 500 MG: 500 TABLET, FILM COATED ORAL at 16:09

## 2021-12-07 RX ADMIN — CARISOPRODOL 350 MG: 350 TABLET ORAL at 09:41

## 2021-12-07 RX ADMIN — HYDROMORPHONE HYDROCHLORIDE 3 MG: 2 INJECTION, SOLUTION INTRAMUSCULAR; INTRAVENOUS; SUBCUTANEOUS at 04:12

## 2021-12-07 RX ADMIN — LORAZEPAM 1 MG: 2 SOLUTION, CONCENTRATE ORAL at 06:08

## 2021-12-07 RX ADMIN — QUETIAPINE FUMARATE 300 MG: 100 TABLET ORAL at 21:34

## 2021-12-07 RX ADMIN — PHENYTOIN SODIUM 100 MG: 100 CAPSULE ORAL at 16:09

## 2021-12-07 RX ADMIN — PHENYTOIN SODIUM 100 MG: 100 CAPSULE ORAL at 09:51

## 2021-12-07 RX ADMIN — GABAPENTIN 600 MG: 300 CAPSULE ORAL at 21:29

## 2021-12-07 RX ADMIN — HYDROMORPHONE HYDROCHLORIDE 4 MG: 2 INJECTION, SOLUTION INTRAMUSCULAR; INTRAVENOUS; SUBCUTANEOUS at 09:59

## 2021-12-07 RX ADMIN — DEXAMETHASONE 4 MG: 4 TABLET ORAL at 14:14

## 2021-12-07 RX ADMIN — METOPROLOL SUCCINATE 50 MG: 50 TABLET, EXTENDED RELEASE ORAL at 09:40

## 2021-12-07 RX ADMIN — LEVETIRACETAM 500 MG: 500 TABLET, FILM COATED ORAL at 21:34

## 2021-12-07 RX ADMIN — CARISOPRODOL 350 MG: 350 TABLET ORAL at 16:09

## 2021-12-07 RX ADMIN — LORAZEPAM 1 MG: 2 SOLUTION, CONCENTRATE ORAL at 23:48

## 2021-12-07 RX ADMIN — LORAZEPAM 1 MG: 2 SOLUTION, CONCENTRATE ORAL at 14:15

## 2021-12-07 RX ADMIN — MORPHINE SULFATE 60 MG: 30 TABLET, FILM COATED, EXTENDED RELEASE ORAL at 09:39

## 2021-12-07 RX ADMIN — MORPHINE SULFATE 60 MG: 30 TABLET, FILM COATED, EXTENDED RELEASE ORAL at 16:08

## 2021-12-07 RX ADMIN — LORAZEPAM 1 MG: 2 SOLUTION, CONCENTRATE ORAL at 07:30

## 2021-12-07 RX ADMIN — LORAZEPAM 1 MG: 2 SOLUTION, CONCENTRATE ORAL at 16:08

## 2021-12-07 RX ADMIN — LORAZEPAM 1 MG: 2 SOLUTION, CONCENTRATE ORAL at 02:10

## 2021-12-07 RX ADMIN — LEVETIRACETAM 500 MG: 500 TABLET, FILM COATED ORAL at 09:40

## 2021-12-07 RX ADMIN — HYDROMORPHONE HYDROCHLORIDE 3 MG: 2 INJECTION, SOLUTION INTRAMUSCULAR; INTRAVENOUS; SUBCUTANEOUS at 07:29

## 2021-12-07 RX ADMIN — HYDROMORPHONE HYDROCHLORIDE 4 MG: 2 INJECTION, SOLUTION INTRAMUSCULAR; INTRAVENOUS; SUBCUTANEOUS at 16:08

## 2021-12-07 RX ADMIN — MORPHINE SULFATE 60 MG: 30 TABLET, FILM COATED, EXTENDED RELEASE ORAL at 21:29

## 2021-12-07 RX ADMIN — GABAPENTIN 600 MG: 300 CAPSULE ORAL at 09:40

## 2021-12-07 RX ADMIN — GABAPENTIN 600 MG: 300 CAPSULE ORAL at 16:08

## 2021-12-07 RX ADMIN — HYDROMORPHONE HYDROCHLORIDE 4 MG: 2 INJECTION, SOLUTION INTRAMUSCULAR; INTRAVENOUS; SUBCUTANEOUS at 11:28

## 2021-12-07 RX ADMIN — PHENYTOIN SODIUM 100 MG: 100 CAPSULE ORAL at 21:41

## 2021-12-07 RX ADMIN — HYDROMORPHONE HYDROCHLORIDE 3 MG: 2 INJECTION, SOLUTION INTRAMUSCULAR; INTRAVENOUS; SUBCUTANEOUS at 02:12

## 2021-12-07 RX ADMIN — HYDROMORPHONE HYDROCHLORIDE 4 MG: 2 INJECTION, SOLUTION INTRAMUSCULAR; INTRAVENOUS; SUBCUTANEOUS at 14:20

## 2021-12-07 RX ADMIN — DICYCLOMINE HYDROCHLORIDE 20 MG: 10 CAPSULE ORAL at 09:41

## 2021-12-07 RX ADMIN — METRONIDAZOLE 500 MG: 250 TABLET ORAL at 09:45

## 2021-12-07 RX ADMIN — LORAZEPAM 1 MG: 2 SOLUTION, CONCENTRATE ORAL at 20:49

## 2021-12-07 RX ADMIN — HYDROMORPHONE HYDROCHLORIDE 2 MG: 2 INJECTION INTRAMUSCULAR; INTRAVENOUS; SUBCUTANEOUS at 00:19

## 2021-12-07 RX ADMIN — LORAZEPAM 1 MG: 2 SOLUTION, CONCENTRATE ORAL at 11:16

## 2021-12-07 RX ADMIN — DEXAMETHASONE 4 MG: 4 TABLET ORAL at 09:40

## 2021-12-07 NOTE — PROGRESS NOTES
[address] [facility] Help to Those in Need  [phone]    Patient Name: Rosa Meigs  YOB: 1975    Date of Provider Hospice Visit: 12/07/21    Level of Care:   [x] General Inpatient (GIP)    [] Routine   [] Respite    Current Location of Care:  [] [facility] [] [facility] [] [address] [address] [facility] [] [address] [x] Hospice House [facility], patient referred from:  [] [facility] [] [facility] [] [address] [] [address] [x] Home [] Other:     [facility] patient    Principle Hospice Diagnosis: Metastatic breast cancer  Diagnoses RELATED to the terminal prognosis: Cancer associated pain, history of stroke, history of schizophrenia, history of seizures  Other Diagnoses:      Matilda Suresh is a 48y.o. year old who was admitted to [facility]. 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 Patient's pain is mostly located in the left arm/scapula/wound in the left breast.  Patient with triple negative breast cancer and currently under the care of [facility]. In discussing with patient, they have been escalating doses of methadone and currently on 40 mg 3 times daily but she does not feel like this is providing any benefit. She also takes Dilaudid 8 mg every 4 hours, gabapentin 300 mg 3 times daily. She has used as needed morphine which actually seem to help as well. Additional adjunct of medication include Decadron 4 mg twice a day and Soma care 50 mg 3 times a day. She is on seizure medication to include Dilantin and Keppra. Patient recovering from herpes zoster/shingles to the left scapular area. In addition, patient has a draining wound from the central aspect of the left breast.  Has significant lymphedema in the left which is where she originally had surgery with lymph node dissection.     The patient's principle diagnosis has resulted in uncontrolled pain  Refer to LCD     Functionally, the patient's Karnofsky and/or Palliative Performance Scale has declined over a period of weeks to months and is estimated at 40 the patient is dependent on the following ADLs: Able to feed herself    Objective information that support this patients limited prognosis includes: Metastatic breast cancer    The patient/family chose comfort measures with the support of Hospice. HOSPICE DIAGNOSES   Active Symptoms:  1. Cancer associated pain  2. History of seizures  3. Wound to the central aspect of the left breast  4. Hospice care     PLAN   1. Patient will continue GIP level of care. Patient had a very difficult night and after reviewing the chart she required Dilaudid 3 mg IV x4 doses, 2 mg IV x12 doses since being admitted. We stopped her methadone and transition to MS Contin but did not receive her MS Contin until this morning. I do think maybe that contributed to some of his pain issue. Patient actually is able to get up and shower and interacting with me this morning with minimal difficulty. A lot of the pain remains in the left scapular area related to her recent zoster. We are to make multiple adjustments today. 2. Pain management-we will increase her gabapentin to 600 mg 3 times a day, adjust MS Contin to 60 mg 3 times daily, increase her Dilaudid to 4 mg IV every 15 minutes as needed. I would like to see her total needs over the next 12 to 24 hours and then consider transition back to sublingual medications for breakthrough medication. Patient will like to try to go home soon as possible but also we need some time to make adjustments and want to make sure these are effective. We will reassess tomorrow her IV needs and than I can adjust accordingly oral equivalents. Patient may need a PCA given the amount of opioids she is requiring. Continue adjunct of therapy with Soma, Decadron, gabapentin. 3. Seizure disorder-continue home medication  4.  Wound care-patient with slightly draining wound-we will use alginate or other type dressing secondary to the draining wound. Continue foam dressing  5. Comfort meds for all other symptoms  6. Plan with bedside nursing team, patient. 7.  and SW to support family needs  8. Disposition: To be determined  9. Hospice Plan of care was reviewed in detail and agree with current plan of care    Prognosis estimated based on 12/07/21 clinical assessment is:   [] Hours to Days    [x] Days to Weeks    [] Other:    Communicated plan of care with: Hospice Case Manager; Hospice IDT; Care Team     GOALS OF CARE     Patient/Medical POA stated Goal of Care: Hospice care    [] I have reviewed and/or updated ACP information in the Advance Care Planning Navigator. This information is available in the [address] link in the patient's chart header. Primary Decision Maker ([address]):     Resuscitation Status: DNR  If DNR is there a Durable DNR on file? : [x] Yes [] No (If no, complete Durable DNR)    HISTORY     History obtained from: Chart, patient    CHIEF COMPLAINT: Left chest/scapular pain  The patient is:   [x] Verbal  [] Nonverbal  [] Unresponsive    HPI/SUBJECTIVE: Patient is awake but uncomfortable. Describes pain in her left arm, left scapula, left breast area    12/7-patient is out of the bed. She was able to shower this morning. Pain remains in the left scapula left arm area.        REVIEW OF SYSTEMS     The following systems were: [x] reviewed  [] unable to be reviewed    Positive ROS include:  Constitutional: fatigue, weakness, in pain, short of breath  Ears/nose/mouth/throat: increased airway secretions  Respiratory:shortness of breath, wheezing  Gastrointestinal:poor appetite, nausea, vomiting, abdominal pain, constipation, diarrhea  Musculoskeletal:pain, deformities, swelling legs  Neurologic:confusion, hallucinations, weakness  Psychiatric:anxiety, feeling depressed, poor sleep  Endocrine:     Adult Non-Verbal Pain Assessment Score: Pain can be as high as 8-9 out of 10    Face  [] 0   No particular expression or smile  [] 1   Occasional grimace, tearing, frowning, wrinkled forehead  [] 2   Frequent grimace, tearing, frowning, wrinkled forehead    Activity (movement)  [] 0   Lying quietly, normal position  [] 1   Seeking attention through movement or slow, cautious movement  [] 2   Restless, excessive activity and/or withdrawal reflexes    Guarding  [] 0   Lying quietly, no positioning of hands over areas of body  [] 1   Splinting areas of the body, tense  [] 2   Rigid, stiff    Physiology (vital signs)  [] 0   Stable vital signs  [] 1   Change in any of the following: SBP > 20mm Hg; HR > 20/minute  [] 2   Change in any of the following: SBP > 30mm Hg; HR > 25/minute    Respiratory  [] 0   Baseline RR/SpO2, compliant with ventilator  [] 1   RR > 10 above baseline, or 5% drop SpO2, mild asynchrony with ventilator  [] 2   RR > 20 above baseline, or 10% drop SpO2, asynchrony with ventilator     FUNCTIONAL ASSESSMENT     Palliative Performance Scale (PPS):50       PSYCHOSOCIAL/SPIRITUAL ASSESSMENT     Active Problems:    * No active hospital problems. *    No past medical history on file. No past surgical history on file. Social History     Tobacco Use    Smoking status: Not on file    Smokeless tobacco: Not on file   Substance Use Topics    Alcohol use: Not on file     No family history on file.    Allergies   Allergen Reactions    Pcn [Penicillins] Swelling      Current Facility-Administered Medications   Medication Dose Route Frequency    morphine CR (MS CONTIN) tablet 60 mg  60 mg Oral TID    HYDROmorphone (DILAUDID) injection 4 mg  4 mg IntraVENous Q15MIN PRN    gabapentin (NEURONTIN) capsule 600 mg  600 mg Oral TID    bisacodyL (DULCOLAX) suppository 10 mg  10 mg Rectal DAILY PRN    acetaminophen (TYLENOL) suppository 650 mg  650 mg Rectal Q6H PRN    albuterol (PROVENTIL HFA, VENTOLIN HFA, PROAIR HFA) inhaler 2 Puff (Patient Supplied)  2 Puff Inhalation Q6H PRN  docusate sodium (COLACE) capsule 100 mg  100 mg Oral DAILY PRN    dexAMETHasone (DECADRON) tablet 4 mg  4 mg Oral BID    dicyclomine (BENTYL) capsule 20 mg (Patient Supplied)  20 mg Oral BID    phenytoin ER (DILANTIN ER) ER capsule 100 mg (Patient Supplied)  100 mg Oral TID    haloperidol (HALDOL) 2 mg/mL oral solution 2 mg  2 mg Oral Q4H PRN    HYDROmorphone (DILAUDID) tablet 8 mg  8 mg Oral Q4H PRN    hyoscyamine SL (LEVSIN/SL) tablet 0.125 mg  0.125 mg SubLINGual Q4H PRN    levETIRAcetam (KEPPRA) tablet 500 mg (Patient Supplied)  500 mg Oral TID    LORazepam (ATIVAN) tablet 1 mg  1 mg Oral Q4H PRN    LORazepam (INTENSOL) 2 mg/mL oral concentrate 2 mg  2 mg Oral Q5MIN PRN    promethazine (PHENERGAN) tablet 25 mg  25 mg Oral TID PRN    senna (SENOKOT) tablet 8.6 mg (Patient Supplied)  1 Tablet Oral DAILY PRN    carisoprodoL (SOMA) tablet 350 mg (Patient Supplied)  350 mg Oral TID    spironolactone (ALDACTONE) tablet 25 mg (Patient Supplied)  25 mg Oral DAILY    metoprolol succinate (TOPROL-XL) XL tablet 50 mg (Patient Supplied)  50 mg Oral DAILY    traZODone (DESYREL) tablet 50 mg (Patient Supplied)  50 mg Oral QHS PRN    metroNIDAZOLE (FLAGYL) tablet 500 mg  500 mg Topical DAILY    QUEtiapine (SEROquel) tablet 300 mg (Patient Supplied)  300 mg Oral QHS    LORazepam (INTENSOL) 2 mg/mL oral concentrate 1 mg  1 mg Oral Q3H        PHYSICAL EXAM     Wt Readings from Last 3 Encounters:   03/06/21 74.8 kg (165 lb)       Visit Vitals  /80 (BP 1 Location: Right upper arm, BP Patient Position: Supine)   Pulse (!) 111   Temp 99.1 °F (37.3 °C)   Resp 20   SpO2 98%       Supplemental O2  [] Yes  [x] NO  Last bowel movement:     Currently this patient has:  [] Peripheral IV [] PICC  [] PORT [] ICD    [] Lester Catheter [] NG Tube   [] PEG Tube    [] Rectal Tube [] Drain  [x] Other: Port    Constitutional: alert and oriented, appears comfortable standing, does not want me to palpate into the left scapular area  Eyes: reactive  ENMT: moist  Cardio: RRR  Respiratory: CTA  Gastrointestinal: soft/NT  Musculoskeletal:muscle wasting, lymphedema on the left arm  Skin:draining lesion in medical aspect of the left breast-approximately 6-8 cm in diameter, healed lesions from zoster in the left scapular region  Neurologic:nonfocal  Psychiatric: slightly anxious  Other:       Pertinent Lab and or Imaging Tests:  Lab Results   Component Value Date/Time    Sodium 140 03/06/2021 04:30 PM    Potassium 3.6 03/06/2021 04:30 PM    Chloride 103 03/06/2021 04:30 PM    CO2 29 03/06/2021 04:30 PM    Anion gap 8 03/06/2021 04:30 PM    Glucose 92 03/06/2021 04:30 PM    BUN 5 (L) 03/06/2021 04:30 PM    Creatinine 0.79 03/06/2021 04:30 PM    BUN/Creatinine ratio 6 (L) 03/06/2021 04:30 PM    GFR est AA >60 03/06/2021 04:30 PM    GFR est non-AA >60 03/06/2021 04:30 PM    Calcium 9.3 03/06/2021 04:30 PM     No results found for: TP, ALBR, TALB, ALB        Total time:   Counseling / coordination time:   > 50% counseling / coordination?:

## 2021-12-07 NOTE — PROGRESS NOTES
Problem: Falls - Risk of  Goal: *Absence of Falls  Description: Document Kacey Starch Fall Risk and appropriate interventions in the flowsheet. Outcome: Progressing Towards Goal  Note: Fall Risk Interventions:            Medication Interventions: Patient to call before getting OOB, Teach patient to arise slowly    Elimination Interventions: Call light in reach              Problem: Pressure Injury - Risk of  Goal: *Prevention of pressure injury  Description: Document Noah Scale and appropriate interventions in the flowsheet.   Outcome: Progressing Towards Goal  Note: Pressure Injury Interventions:  Sensory Interventions: Assess changes in LOC, Maintain/enhance activity level    Moisture Interventions: Minimize layers, Contain wound drainage    Activity Interventions: Pressure redistribution bed/mattress(bed type), Assess need for specialty bed    Mobility Interventions: HOB 30 degrees or less, Float heels    Nutrition Interventions: Document food/fluid/supplement intake    Friction and Shear Interventions: Minimize layers

## 2021-12-07 NOTE — PROGRESS NOTES
0700: Report received from Delfina, [facility]. Patient Resting in bed watching television. 0730: Patient reporting pain 10/10 in left arm and shoulder. PRN hydromorphone 3mg IV and lorazepam 1mg SL administered. Sat with patient who shared stories of some of her life and about her children and grandchildren. Patient reported pain reduced to 8/10 after 20 minutes. 0830: Patient resting in bed, watching television. No needs at this time. 0915: Chest wound cleaned with wound cleanser, flagyl crushed and applied to wound, covered dressing with ABD pad.    0930: Rounds with Dr. Addy Sanford. Patient continues to have pain that has required significant number of PRN hydromorphone in the last 24 hours. Orders received to increase hydromorphone PRN to 4mg q 15 minutes, MS Contin to 60mg tid, and increase gabapentin 600mg tid. Scheduled medications administered as ordered. 1000: PRN hydromorphone 4mg IV administered for pain rated 6/10 and increasing in left arm  1030: Patient resting in bed watching television. Reports pain is 5/10.   1115: Patient resting in bed on telephone. Scheduled lorazepam 1mg SL administered. No additional needs at this time. 1130: Patient called out of room reporting pain rated 10/10 in her left arm and shoulder. PRN hydrmorphone 4mg IV administered   1200: Patient reports pain is now 8/10. She is eating her lunch, and does not want any additional pain medication at this time. 1300: Patient's sons are at the bedside visiting with patient. Patient is in very good spirits and reports pain is \"good\"  1415: Scheduled dexamethasone 4mg and lorazepam 1mg PO administered. Patient reporting pain 9/10 and is requesting PRN medication for pain. Hydromorphone 4mg IV administered for pain. 1440: Left message for Alhaji Ventura RN,  for [facility] of [address] [place] [address] [phone] to request albuterol inhaler to be delivered to [facility] for patient.    1500: Patient is resting in bed with eyes closed, appears sleeping. Neutral facial position observed. respirations are regular depth and rhythm  1600: Patient in bed, reports pain 10/10 after getting up to bathroom. PRN hydromorphone 4mg IV administered with scheduled medications. Patient's  with Selvin Green at the bedside as well. 1635: Patient is resting in bed with eyes closed, appears sleeping. Neutral facial position observed   1715: Patient appears sleeping. Respiration regular depth and rhythm. 1735: Patient is awake and is walking down the hallway with her coat on. She appears confused and disoriented. Assisted patient back to her room. She states that she thought that I called for her to come to me at the nurses station. She reports increased arm pain and is asking for pain medication. PRN hydromorphone 4mg IV administered. Wanderguard placed on patient after discussing with her for safety at night. 1750: Patient is resting in bed with eyes closed, appears sleeping. Respirations are regular depth and rhythm. Neutral facial position. 1810: Patient asleep, able to wake up to take her scheduled bentyl and then she returned to sleep. Placed NC for supplemental oxygen at 3L/min while patient sleeping.    1900: Report given to Mariel Brown RN                     NAME OF PATIENT:  Hiram Duncan    LEVEL OF CARE:  GIP    REASON FOR GIP:   Pain, despite numerous changes in medications, Medication adjustment that must be monitored 24/7 and Stabilizing treatment that cannot take place at home    *PATIENT REMAINS ELIGIBLE FOR GIP LEVEL OF CARE AS EVIDENCED BY: (MUST BE ADDRESSED OF PATIENT GIP)      REASON FOR RESPITE:  NA    O2 SAFETY:  Concentrator positioning (6\" from furniture/drapes), Tanks stored in irene , No petroleum based products on face while oxygen in use and Oxygen sign on the door    FALL INTERVENTIONS PROVIDED:   Implemented/recommended use of non-skid footwear, Implemented/recommended use of fall risk identification flag to all team members, Implemented/recommended assistive devices and encouraged their use, Implemented/recommended resources for alarm system (personal alarm, bed alarm, call bell, etc.) , Implemented/recommended environmental changes (remove hazards, lower bed, improve lighting, etc.) and Implemented/recommended increased supervision/assistance    INTERDISPLINARY COMMUNICATION/COLLABORATION:  Physician, MSW, Bipin and RN, CNA    NEW MEDICATION INITIATION DOCUMENTATION:  Documentation completed in Clinical Note in [facility]    Reason medication is being initiated:  Continued pain with current orders    MD / Provider name consulted re: change in status / initiation of new medication:  Dr. Justino Chávez Symptom(s):  Pain    New Order(s):  Increased PRN hydrmorphone to 4mg IV a 15 min, increase MSContin to 60mg tid, increase gabapentin to 600mg tid    Name of the person notified of the changes:  Antwan Horvath    Name of person being taught:  Antwan Horvath    Instructions given:  Call before getting out of bed, stand slowly    Side Effects taught:  Drowsiness    Response to teaching:  Verbalized understanding      COMFORTABLE DYING MEASURE:  Is Patient/family satisfied with symptom level?  yes    DISCHARGE PLAN:  Home when symptom management goals are met

## 2021-12-08 PROCEDURE — G0300 HHS/HOSPICE OF LPN EA 15 MIN: HCPCS

## 2021-12-08 PROCEDURE — 0656 HSPC GENERAL INPATIENT

## 2021-12-08 PROCEDURE — 74011250637 HC RX REV CODE- 250/637: Performed by: FAMILY MEDICINE

## 2021-12-08 PROCEDURE — 74011250637 HC RX REV CODE- 250/637: Performed by: INTERNAL MEDICINE

## 2021-12-08 PROCEDURE — 74011250636 HC RX REV CODE- 250/636: Performed by: FAMILY MEDICINE

## 2021-12-08 RX ORDER — HYDROMORPHONE HYDROCHLORIDE 8 MG/1
16 TABLET ORAL
Status: DISCONTINUED | OUTPATIENT
Start: 2021-12-08 | End: 2021-12-09 | Stop reason: HOSPADM

## 2021-12-08 RX ADMIN — HYDROMORPHONE HYDROCHLORIDE 4 MG: 2 INJECTION, SOLUTION INTRAMUSCULAR; INTRAVENOUS; SUBCUTANEOUS at 06:39

## 2021-12-08 RX ADMIN — PHENYTOIN SODIUM 100 MG: 100 CAPSULE ORAL at 22:00

## 2021-12-08 RX ADMIN — LORAZEPAM 1 MG: 2 SOLUTION, CONCENTRATE ORAL at 20:20

## 2021-12-08 RX ADMIN — GABAPENTIN 600 MG: 300 CAPSULE ORAL at 22:33

## 2021-12-08 RX ADMIN — METOPROLOL SUCCINATE 50 MG: 50 TABLET, EXTENDED RELEASE ORAL at 09:13

## 2021-12-08 RX ADMIN — MORPHINE SULFATE 60 MG: 30 TABLET, FILM COATED, EXTENDED RELEASE ORAL at 09:12

## 2021-12-08 RX ADMIN — GABAPENTIN 600 MG: 300 CAPSULE ORAL at 16:09

## 2021-12-08 RX ADMIN — DICYCLOMINE HYDROCHLORIDE 20 MG: 10 CAPSULE ORAL at 18:00

## 2021-12-08 RX ADMIN — SPIRONOLACTONE 25 MG: 25 TABLET ORAL at 09:13

## 2021-12-08 RX ADMIN — METRONIDAZOLE 500 MG: 250 TABLET ORAL at 09:12

## 2021-12-08 RX ADMIN — LEVETIRACETAM 500 MG: 500 TABLET, FILM COATED ORAL at 16:39

## 2021-12-08 RX ADMIN — CARISOPRODOL 350 MG: 350 TABLET ORAL at 16:39

## 2021-12-08 RX ADMIN — PHENYTOIN SODIUM 100 MG: 100 CAPSULE ORAL at 16:39

## 2021-12-08 RX ADMIN — GABAPENTIN 600 MG: 300 CAPSULE ORAL at 09:12

## 2021-12-08 RX ADMIN — CARISOPRODOL 350 MG: 350 TABLET ORAL at 22:00

## 2021-12-08 RX ADMIN — DEXAMETHASONE 4 MG: 4 TABLET ORAL at 14:29

## 2021-12-08 RX ADMIN — HYDROMORPHONE HYDROCHLORIDE 4 MG: 2 INJECTION, SOLUTION INTRAMUSCULAR; INTRAVENOUS; SUBCUTANEOUS at 08:01

## 2021-12-08 RX ADMIN — LEVETIRACETAM 500 MG: 500 TABLET, FILM COATED ORAL at 09:13

## 2021-12-08 RX ADMIN — QUETIAPINE FUMARATE 300 MG: 100 TABLET ORAL at 22:00

## 2021-12-08 RX ADMIN — LORAZEPAM 1 MG: 2 SOLUTION, CONCENTRATE ORAL at 10:59

## 2021-12-08 RX ADMIN — LORAZEPAM 1 MG: 2 SOLUTION, CONCENTRATE ORAL at 05:57

## 2021-12-08 RX ADMIN — LORAZEPAM 1 MG: 2 SOLUTION, CONCENTRATE ORAL at 14:29

## 2021-12-08 RX ADMIN — MORPHINE SULFATE 60 MG: 30 TABLET, FILM COATED, EXTENDED RELEASE ORAL at 16:08

## 2021-12-08 RX ADMIN — DEXAMETHASONE 4 MG: 4 TABLET ORAL at 09:12

## 2021-12-08 RX ADMIN — LORAZEPAM 1 MG: 2 SOLUTION, CONCENTRATE ORAL at 03:12

## 2021-12-08 RX ADMIN — CARISOPRODOL 350 MG: 350 TABLET ORAL at 09:14

## 2021-12-08 RX ADMIN — LORAZEPAM 1 MG: 2 SOLUTION, CONCENTRATE ORAL at 16:09

## 2021-12-08 RX ADMIN — HYDROMORPHONE HYDROCHLORIDE 16 MG: 8 TABLET ORAL at 10:59

## 2021-12-08 RX ADMIN — DICYCLOMINE HYDROCHLORIDE 20 MG: 10 CAPSULE ORAL at 09:14

## 2021-12-08 RX ADMIN — MORPHINE SULFATE 60 MG: 30 TABLET, FILM COATED, EXTENDED RELEASE ORAL at 22:32

## 2021-12-08 RX ADMIN — LEVETIRACETAM 500 MG: 500 TABLET, FILM COATED ORAL at 22:00

## 2021-12-08 RX ADMIN — PHENYTOIN SODIUM 100 MG: 100 CAPSULE ORAL at 09:13

## 2021-12-08 RX ADMIN — LORAZEPAM 1 MG: 2 SOLUTION, CONCENTRATE ORAL at 22:34

## 2021-12-08 RX ADMIN — LORAZEPAM 1 MG: 2 SOLUTION, CONCENTRATE ORAL at 08:01

## 2021-12-08 RX ADMIN — HYDROMORPHONE HYDROCHLORIDE 4 MG: 2 INJECTION, SOLUTION INTRAMUSCULAR; INTRAVENOUS; SUBCUTANEOUS at 03:12
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
## 2021-12-08 NOTE — PROGRESS NOTES
Problem: Falls - Risk of  Goal: *Absence of Falls  Description: Document Maddison Damico Fall Risk and appropriate interventions in the flowsheet. Outcome: Progressing Towards Goal  Note: Fall Risk Interventions:       Mentation Interventions: Adequate sleep, hydration, pain control, Bed/chair exit alarm, Door open when patient unattended    Medication Interventions: Bed/chair exit alarm, Patient to call before getting OOB, Teach patient to arise slowly    Elimination Interventions: Bed/chair exit alarm, Call light in reach              Problem: Pressure Injury - Risk of  Goal: *Prevention of pressure injury  Description: Document Noah Scale and appropriate interventions in the flowsheet.   Outcome: Progressing Towards Goal  Note: Pressure Injury Interventions:  Sensory Interventions: Check visual cues for pain, Minimize linen layers    Moisture Interventions: Absorbent underpads, Minimize layers    Activity Interventions: Pressure redistribution bed/mattress(bed type)    Mobility Interventions: HOB 30 degrees or less, Pressure redistribution bed/mattress (bed type)    Nutrition Interventions: Offer support with meals,snacks and hydration    Friction and Shear Interventions: Minimize layers

## 2021-12-08 NOTE — PROGRESS NOTES
Problem: Falls - Risk of  Goal: *Absence of Falls  Description: Document Sheron Meeks Fall Risk and appropriate interventions in the flowsheet. Outcome: Progressing Towards Goal  Note: Fall Risk Interventions:  Mobility Interventions: Patient to call before getting OOB    Mentation Interventions: Adequate sleep, hydration, pain control, Bed/chair exit alarm, Door open when patient unattended    Medication Interventions: Patient to call before getting OOB    Elimination Interventions: Bed/chair exit alarm, Call light in reach, Patient to call for help with toileting needs              Problem: Patient Education: Go to Patient Education Activity  Goal: Patient/Family Education  Outcome: Progressing Towards Goal     Problem: Pressure Injury - Risk of  Goal: *Prevention of pressure injury  Description: Document Noah Scale and appropriate interventions in the flowsheet.   Outcome: Progressing Towards Goal  Note: Pressure Injury Interventions:  Sensory Interventions: Assess changes in LOC, Check visual cues for pain, Pressure redistribution bed/mattress (bed type), Keep linens dry and wrinkle-free, Minimize linen layers    Moisture Interventions: Maintain skin hydration (lotion/cream)    Activity Interventions: Pressure redistribution bed/mattress(bed type)    Mobility Interventions: HOB 30 degrees or less, Pressure redistribution bed/mattress (bed type)    Nutrition Interventions: Offer support with meals,snacks and hydration    Friction and Shear Interventions: Minimize layers                Problem: Patient Education: Go to Patient Education Activity  Goal: Patient/Family Education  Outcome: Progressing Towards Goal     Problem: Pain  Goal: *Control of Pain  Outcome: Progressing Towards Goal
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
## 2021-12-08 NOTE — PROGRESS NOTES
[facility]  [phone]    Patient Name: Kimberli Torres  YOB: 1975    Date of Provider Hospice Visit: 12/08/21    Level of Care:   [x] General Inpatient (GIP)    [] Routine   [] Respite    Current Location of Care:  [] [facility] [] [facility] [] [address] [] [facility] [x] [facility] [place] [facility], patient referred from:  [] [facility] [] [facility] [] [address] [] [facility] [x] Home [] Other:     [facility] patient    Principle Hospice Diagnosis: Metastatic breast cancer  Diagnoses RELATED to the terminal prognosis: Cancer associated pain, history of stroke, history of schizophrenia, history of seizures  Other Diagnoses:      iTm Eason is a 48y.o. year old who was admitted to [facility]. 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 Patient's pain is mostly located in the left arm/scapula/wound in the left breast.  Patient with triple negative breast cancer and currently under the care of [facility]. In discussing with patient, they have been escalating doses of methadone and currently on 40 mg 3 times daily but she does not feel like this is providing any benefit. She also takes Dilaudid 8 mg every 4 hours, gabapentin 300 mg 3 times daily. She has used as needed morphine which actually seem to help as well. Additional adjunct of medication include Decadron 4 mg twice a day and Soma care 50 mg 3 times a day. She is on seizure medication to include Dilantin and Keppra. Patient recovering from herpes zoster/shingles to the left scapular area. In addition, patient has a draining wound from the central aspect of the left breast.  Has significant lymphedema in the left which is where she originally had surgery with lymph node dissection.     The patient's principle diagnosis has resulted in uncontrolled pain  Refer to LCD     Functionally, the patient's Karnofsky and/or Palliative Performance Scale has declined over a period of weeks to months and is estimated at 40 the patient is dependent on the following ADLs: Able to feed herself    Objective information that support this patients limited prognosis includes: Metastatic breast cancer    The patient/family chose comfort measures with the support of Hospice. HOSPICE DIAGNOSES   Active Symptoms:  1. Cancer associated pain  2. History of seizures  3. Wound to the central aspect of the left breast  4. Hospice care     PLAN   1. Patient will continue GIP level of care. Patient had a better night last night as far as pain but became significantly anxious-had a dream about her  mother. She is able to be calmed by nursing staff overnight. 2. Talk with the patient this afternoon-she does feel like the new regimen of medication has been very helpful. She seems to be tolerating the medication. In addition, her son apparently has cleared out the house of people that were staying there that were causing increased stress. She is also worried about her medication. .  3. Pain management-patient tolerating MS Contin 60 mg 3 times a day. She required 9 as needed doses of IV Dilaudid in the last 24 hours. We will make adjustments and change the Dilaudid to 16 mg every 1 hours which is the 4 mg IV equivalent. We will try the oral equivalent first.  Continue adjunct of therapy with Soma, Decadron, gabapentin. We will make further adjustments tomorrow after we see her overall needs. 4. Patient really wants to go home tomorrow nothing therapy possible assuming medication has been effective overnight. 5. Seizure disorder-continue home medication  6. Wound care-patient with slightly draining wound-we will use alginate or other type dressing secondary to the draining wound. Continue foam dressing  7. Comfort meds for all other symptoms  8. Plan with bedside nursing team, patient.      5.  and SW to support family needs  10. Disposition: To be determined  11. Hospice Plan of care was reviewed in detail and agree with current plan of care    Prognosis estimated based on 12/08/21 clinical assessment is:   [] Hours to Days    [x] Days to Weeks    [] Other:    Communicated plan of care with: Hospice Case Manager; Hospice IDT; Care Team     GOALS OF CARE     Patient/Medical POA stated Goal of Care: Hospice care    [] I have reviewed and/or updated ACP information in the Advance Care Planning Navigator. This information is available in the [address] link in the patient's chart header. Primary Decision Maker ([address]):     Resuscitation Status: DNR  If DNR is there a Durable DNR on file? : [x] Yes [] No (If no, complete Durable DNR)    HISTORY     History obtained from: Chart, patient    CHIEF COMPLAINT: Left chest/scapular pain  The patient is:   [x] Verbal  [] Nonverbal  [] Unresponsive    HPI/SUBJECTIVE: Patient is awake but uncomfortable. Describes pain in her left arm, left scapula, left breast area    12/7-patient is out of the bed. She was able to shower this morning. Pain remains in the left scapula left arm area. 12/8-patient sitting on side of the bed. Appears pretty comfortable. Pain seems better managed overall. She just met with [facility] .   This is been very helpful to her as well       REVIEW OF SYSTEMS     The following systems were: [x] reviewed  [] unable to be reviewed    Positive ROS include:  Constitutional: fatigue, weakness, in pain, short of breath  Ears/nose/mouth/throat: increased airway secretions  Respiratory:shortness of breath, wheezing  Gastrointestinal:poor appetite, nausea, vomiting, abdominal pain, constipation, diarrhea  Musculoskeletal:pain, deformities, swelling legs  Neurologic:confusion, hallucinations, weakness  Psychiatric:anxiety, feeling depressed, poor sleep  Endocrine:     Adult Non-Verbal Pain Assessment Score: Pain can be as high as 8 out of 10 but sometimes as low as 4 out of 10    Face  [] 0   No particular expression or smile  [] 1   Occasional grimace, tearing, frowning, wrinkled forehead  [] 2   Frequent grimace, tearing, frowning, wrinkled forehead    Activity (movement)  [] 0   Lying quietly, normal position  [] 1   Seeking attention through movement or slow, cautious movement  [] 2   Restless, excessive activity and/or withdrawal reflexes    Guarding  [] 0   Lying quietly, no positioning of hands over areas of body  [] 1   Splinting areas of the body, tense  [] 2   Rigid, stiff    Physiology (vital signs)  [] 0   Stable vital signs  [] 1   Change in any of the following: SBP > 20mm Hg; HR > 20/minute  [] 2   Change in any of the following: SBP > 30mm Hg; HR > 25/minute    Respiratory  [] 0   Baseline RR/SpO2, compliant with ventilator  [] 1   RR > 10 above baseline, or 5% drop SpO2, mild asynchrony with ventilator  [] 2   RR > 20 above baseline, or 10% drop SpO2, asynchrony with ventilator     FUNCTIONAL ASSESSMENT     Palliative Performance Scale (PPS):50       PSYCHOSOCIAL/SPIRITUAL ASSESSMENT     Active Problems:    * No active hospital problems. *    No past medical history on file. No past surgical history on file. Social History     Tobacco Use    Smoking status: Not on file    Smokeless tobacco: Not on file   Substance Use Topics    Alcohol use: Not on file     No family history on file.    Allergies   Allergen Reactions    Pcn [Penicillins] Swelling      Current Facility-Administered Medications   Medication Dose Route Frequency    HYDROmorphone (DILAUDID) tablet 16 mg (Patient Supplied)  16 mg Oral Q4H PRN    morphine CR (MS CONTIN) tablet 60 mg  60 mg Oral TID    HYDROmorphone (DILAUDID) injection 4 mg  4 mg IntraVENous Q15MIN PRN    gabapentin (NEURONTIN) capsule 600 mg  600 mg Oral TID    bisacodyL (DULCOLAX) suppository 10 mg  10 mg Rectal DAILY PRN    acetaminophen (TYLENOL) suppository 650 mg  650 mg Rectal Q6H PRN  albuterol (PROVENTIL HFA, VENTOLIN HFA, PROAIR HFA) inhaler 2 Puff (Patient Supplied)  2 Puff Inhalation Q6H PRN    docusate sodium (COLACE) capsule 100 mg  100 mg Oral DAILY PRN    dexAMETHasone (DECADRON) tablet 4 mg  4 mg Oral BID    dicyclomine (BENTYL) capsule 20 mg (Patient Supplied)  20 mg Oral BID    phenytoin ER (DILANTIN ER) ER capsule 100 mg (Patient Supplied)  100 mg Oral TID    haloperidol (HALDOL) 2 mg/mL oral solution 2 mg  2 mg Oral Q4H PRN    hyoscyamine SL (LEVSIN/SL) tablet 0.125 mg  0.125 mg SubLINGual Q4H PRN    levETIRAcetam (KEPPRA) tablet 500 mg (Patient Supplied)  500 mg Oral TID    LORazepam (ATIVAN) tablet 1 mg  1 mg Oral Q4H PRN    LORazepam (INTENSOL) 2 mg/mL oral concentrate 2 mg  2 mg Oral Q5MIN PRN    promethazine (PHENERGAN) tablet 25 mg  25 mg Oral TID PRN    senna (SENOKOT) tablet 8.6 mg (Patient Supplied)  1 Tablet Oral DAILY PRN    carisoprodoL (SOMA) tablet 350 mg (Patient Supplied)  350 mg Oral TID    spironolactone (ALDACTONE) tablet 25 mg (Patient Supplied)  25 mg Oral DAILY    metoprolol succinate (TOPROL-XL) XL tablet 50 mg (Patient Supplied)  50 mg Oral DAILY    traZODone (DESYREL) tablet 50 mg (Patient Supplied)  50 mg Oral QHS PRN    metroNIDAZOLE (FLAGYL) tablet 500 mg  500 mg Topical DAILY    QUEtiapine (SEROquel) tablet 300 mg (Patient Supplied)  300 mg Oral QHS    LORazepam (INTENSOL) 2 mg/mL oral concentrate 1 mg  1 mg Oral Q3H        PHYSICAL EXAM     Wt Readings from Last 3 Encounters:   03/06/21 74.8 kg (165 lb)       Visit Vitals  BP (!) 147/105 (BP 1 Location: Right lower arm, BP Patient Position: Reclining;Supine)   Pulse (!) 102   Temp 99 °F (37.2 °C)   Resp 18   SpO2 98%       Supplemental O2  [] Yes  [x] NO  Last bowel movement:     Currently this patient has:  [] Peripheral IV [] PICC  [] PORT [] ICD    [] Lester Catheter [] NG Tube   [] PEG Tube    [] Rectal Tube [] Drain  [x] Other: Port    Constitutional: alert and oriented, sitting on side of the bed. Appears comfortable.   Appears calm  Eyes: reactive  ENMT: moist  Cardio: RRR  Respiratory: CTA  Gastrointestinal: soft/NT  Musculoskeletal:muscle wasting, lymphedema on the left arm  Skin:draining lesion in medical aspect of the left breast-approximately 6-8 cm in diameter, healed lesions from zoster in the left scapular region  Neurologic:nonfocal  Psychiatric: Calm  Other:       Pertinent Lab and or Imaging Tests:  Lab Results   Component Value Date/Time    Sodium 140 03/06/2021 04:30 PM    Potassium 3.6 03/06/2021 04:30 PM    Chloride 103 03/06/2021 04:30 PM    CO2 29 03/06/2021 04:30 PM    Anion gap 8 03/06/2021 04:30 PM    Glucose 92 03/06/2021 04:30 PM    BUN 5 (L) 03/06/2021 04:30 PM    Creatinine 0.79 03/06/2021 04:30 PM    BUN/Creatinine ratio 6 (L) 03/06/2021 04:30 PM    GFR est AA >60 03/06/2021 04:30 PM    GFR est non-AA >60 03/06/2021 04:30 PM    Calcium 9.3 03/06/2021 04:30 PM     No results found for: TP, ALBR, TALB, ALB        Total time:   Counseling / coordination time:   > 50% counseling / coordination?:

## 2021-12-09 VITALS
RESPIRATION RATE: 20 BRPM | SYSTOLIC BLOOD PRESSURE: 128 MMHG | DIASTOLIC BLOOD PRESSURE: 98 MMHG | OXYGEN SATURATION: 100 % | TEMPERATURE: 97.8 F | HEART RATE: 90 BPM

## 2021-12-09 PROCEDURE — 0651 HSPC ROUTINE HOME CARE

## 2021-12-09 PROCEDURE — 74011250636 HC RX REV CODE- 250/636: Performed by: FAMILY MEDICINE

## 2021-12-09 PROCEDURE — 74011250637 HC RX REV CODE- 250/637: Performed by: INTERNAL MEDICINE

## 2021-12-09 PROCEDURE — G0299 HHS/HOSPICE OF RN EA 15 MIN: HCPCS

## 2021-12-09 PROCEDURE — 74011250637 HC RX REV CODE- 250/637: Performed by: FAMILY MEDICINE

## 2021-12-09 RX ORDER — HEPARIN 100 UNIT/ML
500 SYRINGE INTRAVENOUS AS NEEDED
Status: DISCONTINUED | OUTPATIENT
Start: 2021-12-09 | End: 2021-12-09 | Stop reason: HOSPADM

## 2021-12-09 RX ADMIN — PHENYTOIN SODIUM 100 MG: 100 CAPSULE ORAL at 15:30

## 2021-12-09 RX ADMIN — PHENYTOIN SODIUM 100 MG: 100 CAPSULE ORAL at 08:31

## 2021-12-09 RX ADMIN — LORAZEPAM 1 MG: 2 SOLUTION, CONCENTRATE ORAL at 13:26

## 2021-12-09 RX ADMIN — LORAZEPAM 1 MG: 2 SOLUTION, CONCENTRATE ORAL at 16:33

## 2021-12-09 RX ADMIN — CARISOPRODOL 350 MG: 350 TABLET ORAL at 08:32

## 2021-12-09 RX ADMIN — DEXAMETHASONE 4 MG: 4 TABLET ORAL at 08:30

## 2021-12-09 RX ADMIN — LORAZEPAM 1 MG: 2 SOLUTION, CONCENTRATE ORAL at 07:37

## 2021-12-09 RX ADMIN — DICYCLOMINE HYDROCHLORIDE 20 MG: 10 CAPSULE ORAL at 08:32

## 2021-12-09 RX ADMIN — MORPHINE SULFATE 60 MG: 30 TABLET, FILM COATED, EXTENDED RELEASE ORAL at 16:33

## 2021-12-09 RX ADMIN — LORAZEPAM 1 MG: 2 SOLUTION, CONCENTRATE ORAL at 03:11

## 2021-12-09 RX ADMIN — LEVETIRACETAM 500 MG: 500 TABLET, FILM COATED ORAL at 08:31

## 2021-12-09 RX ADMIN — LEVETIRACETAM 500 MG: 500 TABLET, FILM COATED ORAL at 15:30

## 2021-12-09 RX ADMIN — CARISOPRODOL 350 MG: 350 TABLET ORAL at 15:29

## 2021-12-09 RX ADMIN — GABAPENTIN 600 MG: 300 CAPSULE ORAL at 15:30

## 2021-12-09 RX ADMIN — METRONIDAZOLE 500 MG: 250 TABLET ORAL at 08:30

## 2021-12-09 RX ADMIN — MORPHINE SULFATE 60 MG: 30 TABLET, FILM COATED, EXTENDED RELEASE ORAL at 08:30

## 2021-12-09 RX ADMIN — METOPROLOL SUCCINATE 50 MG: 50 TABLET, EXTENDED RELEASE ORAL at 08:32

## 2021-12-09 RX ADMIN — HYDROMORPHONE HYDROCHLORIDE 16 MG: 8 TABLET ORAL at 03:12

## 2021-12-09 RX ADMIN — LORAZEPAM 1 MG: 2 SOLUTION, CONCENTRATE ORAL at 04:56

## 2021-12-09 RX ADMIN — LORAZEPAM 1 MG: 2 SOLUTION, CONCENTRATE ORAL at 11:18

## 2021-12-09 RX ADMIN — GABAPENTIN 600 MG: 300 CAPSULE ORAL at 08:30

## 2021-12-09 RX ADMIN — SPIRONOLACTONE 25 MG: 25 TABLET ORAL at 08:32

## 2021-12-09 RX ADMIN — DEXAMETHASONE 4 MG: 4 TABLET ORAL at 13:26

## 2021-12-09 NOTE — PROGRESS NOTES
Problem: Falls - Risk of  Goal: *Absence of Falls  Description: Document Jakob Chao Fall Risk and appropriate interventions in the flowsheet. Outcome: Progressing Towards Goal  Note: Fall Risk Interventions:  Mobility Interventions: Bed/chair exit alarm, Patient to call before getting OOB    Mentation Interventions: Adequate sleep, hydration, pain control, Door open when patient unattended    Medication Interventions: Patient to call before getting OOB    Elimination Interventions: Call light in reach              Problem: Patient Education: Go to Patient Education Activity  Goal: Patient/Family Education  Outcome: Progressing Towards Goal     Problem: Pressure Injury - Risk of  Goal: *Prevention of pressure injury  Description: Document Noah Scale and appropriate interventions in the flowsheet.   Outcome: Progressing Towards Goal  Note: Pressure Injury Interventions:  Sensory Interventions: Assess changes in LOC, Float heels, Keep linens dry and wrinkle-free, Minimize linen layers, Pressure redistribution bed/mattress (bed type)    Moisture Interventions: Minimize layers, Maintain skin hydration (lotion/cream)    Activity Interventions: Pressure redistribution bed/mattress(bed type)    Mobility Interventions: Pressure redistribution bed/mattress (bed type)    Nutrition Interventions: Offer support with meals,snacks and hydration    Friction and Shear Interventions: Apply protective barrier, creams and emollients                Problem: Patient Education: Go to Patient Education Activity  Goal: Patient/Family Education  Outcome: Progressing Towards Goal     Problem: Pain  Goal: *Control of Pain  Outcome: Progressing Towards Goal

## 2021-12-09 NOTE — PROGRESS NOTES
700: Report received from [facility]. Patient is sleeping on the couch in her room. 0720: Patient is awake, anxious, reporting pain in her left arm, but unable to assign a number. Scheduled lorazepam 1mg SL administered and reinforced breathing techniques to calm back down. Patient reported decreased pain within 5 minutes of breathing slowly and deeply. 0830: Patient sitting up on couch, reports that she is feeling much better. She is eating breakfast.   Patient assessment complete. Documented in flowsheets  0930: Patient resting on couch watching television. No needs at this time  1015: Patient's  with Selvin Green at the bedside. Patient appears to be enjoying the visit. She reports pain is 5/10, does not need pain medication at this time. [facility] RN also present. 1115: Patient sitting on couch in room, she is sitting with her head down and states that she is just thinking about some things, but she does not want to talk right now. She says it is nothing big. Scheduled lorazepam 1mg SL administered. 1200: Patient resting on couch with eyes clsoed, appears sleeping. 1230: Rounds with Dr. Samantha Miller, patient to return home today. 1240: Wound care performed to chest wound. 1320: Scheduled medications administered. Patient appears anxious, and verbalizes feeling anxious about returning home today  [phone]: Patient's home  from [facility]  At the bedside, patient enjoying visit. 1530: Patient resting on couch watching television. Scheduled medications administered. 1630: Scheduled lorazepam administered. Patient awaiting her son to pick her to up to transport her home this evening. 1730: Patient sleeping on couch. Dinner meal set up in room for patient. She is calling her son to find out why he has not arrived to pick her up.   1800: Patient's son arrived to transport patient home. PAteint sent home with all belongings and medications.   RAY has arranged to have additional medications delivered to home tonight

## 2021-12-09 NOTE — PROGRESS NOTES
Problem: Falls - Risk of  Goal: *Absence of Falls  Description: Document [place] Reason Fall Risk and appropriate interventions in the flowsheet. Outcome: Progressing Towards Goal  Note: Fall Risk Interventions:  Mobility Interventions: Bed/chair exit alarm, Patient to call before getting OOB    Mentation Interventions: Adequate sleep, hydration, pain control, Door open when patient unattended    Medication Interventions: Patient to call before getting OOB    Elimination Interventions: Call light in reach              Problem: Patient Education: Go to Patient Education Activity  Goal: Patient/Family Education  Outcome: Progressing Towards Goal     Problem: Pressure Injury - Risk of  Goal: *Prevention of pressure injury  Description: Document Noah Scale and appropriate interventions in the flowsheet.   Outcome: Progressing Towards Goal  Note: Pressure Injury Interventions:  Sensory Interventions: Assess changes in LOC, Float heels, Keep linens dry and wrinkle-free, Minimize linen layers, Pressure redistribution bed/mattress (bed type)    Moisture Interventions: Minimize layers, Maintain skin hydration (lotion/cream)    Activity Interventions: Pressure redistribution bed/mattress(bed type)    Mobility Interventions: Pressure redistribution bed/mattress (bed type)    Nutrition Interventions: Offer support with meals,snacks and hydration    Friction and Shear Interventions: Apply protective barrier, creams and emollients                Problem: Patient Education: Go to Patient Education Activity  Goal: Patient/Family Education  Outcome: Progressing Towards Goal     Problem: Pain  Goal: *Control of Pain  Outcome: Progressing Towards Goal

## 2021-12-09 NOTE — PROGRESS NOTES
NAME OF PATIENT:  Nimo Pollock    LEVEL OF CARE:  [facility]    REASON FOR GIP:   Pain, despite numerous changes in medications and Anxiety    *PATIENT REMAINS ELIGIBLE FOR [facility] LEVEL OF CARE AS EVIDENCED BY: (MUST BE ADDRESSED OF PATIENT GIP) D/T pain  and anxiety      REASON FOR RESPITE:  N/A    O2 SAFETY:  Concentrator positioning (6\" from furniture/drapes), Oxygen sign on the door and PRN via NC    FALL INTERVENTIONS PROVIDED:   Implemented/recommended use of non-skid footwear, Implemented/recommended use of fall risk identification flag to all team members, Implemented/recommended resources for alarm system (personal alarm, bed alarm, call bell, etc.)  and Implemented/recommended environmental changes (remove hazards, lower bed, improve lighting, etc.)    INTERDISPLINARY COMMUNICATION/COLLABORATION:  Physician, BERNABE, Katie Vázquez RN, CNA and LPN    NEW MEDICATION INITIATION DOCUMENTATION:  Documentation completed in Clinical Note in [facility]    Reason medication is being initiated:  N/A    MD / Provider name consulted re: change in status / initiation of new medication:  N/A    New Symptom(s):  N/A    New Order(s):  N/A    Name of the person notified of the changes:  N/A    Name of person being taught:  N/A    Instructions given:  N/A    Side Effects taught:  N/A    Response to teaching:  N/A      COMFORTABLE DYING MEASURE:  Is Patient/family satisfied with symptom level?  yes    DISCHARGE PLAN:  Home

## 2021-12-09 NOTE — PROGRESS NOTES
1910 - Received report from [facility]. Patient resting comfortably lying on couch in room asleep. 2020- Administered Lorazepam, patient tolerated medications well. Patient ws on the telephone no respiratory or cardiac distress noted. 2110- Received Hydromorphone tabs from pharmacy  2210- Patient tearful c/o pain left arm. Will administer scheduled medications. 2235- Administered medication, patient tolerated well. 2300- F/U on pain and medication, patient report it has not been effective but it is tolerable. Patient is lying on couch in room resting quietly. No cardiac or respiratory distress noted.  Report given to Negar Garcia RN

## 2021-12-09 NOTE — PROGRESS NOTES
Problem: Falls - Risk of  Goal: *Absence of Falls  Description: Document Jordin Carvajal Fall Risk and appropriate interventions in the flowsheet. Outcome: Progressing Towards Goal  Note: Fall Risk Interventions:  Mobility Interventions: Bed/chair exit alarm, Patient to call before getting OOB    Mentation Interventions: Adequate sleep, hydration, pain control    Medication Interventions: Bed/chair exit alarm, Patient to call before getting OOB, Teach patient to arise slowly    Elimination Interventions: Bed/chair exit alarm, Call light in reach, Patient to call for help with toileting needs              Problem: Pressure Injury - Risk of  Goal: *Prevention of pressure injury  Description: Document Noah Scale and appropriate interventions in the flowsheet.   Outcome: Progressing Towards Goal  Note: Pressure Injury Interventions:  Sensory Interventions: Assess changes in LOC, Check visual cues for pain, Pressure redistribution bed/mattress (bed type), Keep linens dry and wrinkle-free, Minimize linen layers    Moisture Interventions: Maintain skin hydration (lotion/cream)    Activity Interventions: Pressure redistribution bed/mattress(bed type)    Mobility Interventions: HOB 30 degrees or less, Pressure redistribution bed/mattress (bed type)    Nutrition Interventions: Offer support with meals,snacks and hydration    Friction and Shear Interventions: Minimize layers                Problem: Pain  Goal: *Control of Pain  Outcome: Progressing Towards Goal

## 2021-12-09 NOTE — PROGRESS NOTES
0000 Received report from [facility]. Patient sleeping on the sofa. Appears comfortable with no signs of distress. Patient with relaxed facial expression. 0100  Patient remains asleep on the sofa. Neutral facial expression. 0200 Attempted three times to wake patient to administer scheduled Lorazepam 1mg, but she refused. Will continue to monitor and administer when she wakes and requests it or the next dose is due. Patient appears very comfortable. She is sleeping on the sofa and snoring softly. 0300 Patient complains of pain rated 8 on pain scale in back and left arm. Requesting pain medication. 8810 Scheduled Lorazepam and PRN Dilaudid administered. Patient requesting ice cream which was provided. 0400 Patient awake, sitting on sofa and talking on the phone. 2414  Patient complains of anxiety and being cold. [place] provided and scheduled Lorazepam administered. Had a long conversation with her about her children and dogs and how happy she is to see her dogs. 0530 Patient again sleeping on sofa. She states that the cool sofa helps her shingles not hurt as much. 0700 Report given to [employer], RN.
